# Patient Record
Sex: MALE | Race: WHITE | Employment: OTHER | URBAN - METROPOLITAN AREA
[De-identification: names, ages, dates, MRNs, and addresses within clinical notes are randomized per-mention and may not be internally consistent; named-entity substitution may affect disease eponyms.]

---

## 2017-07-06 ENCOUNTER — HOSPITAL ENCOUNTER (EMERGENCY)
Age: 82
Discharge: HOME | DRG: 853 | End: 2017-07-07
Attending: EMERGENCY MEDICINE
Payer: MEDICARE

## 2017-07-06 ENCOUNTER — APPOINTMENT (OUTPATIENT)
Dept: CT IMAGING | Age: 82
DRG: 853 | End: 2017-07-06
Attending: EMERGENCY MEDICINE
Payer: MEDICARE

## 2017-07-06 ENCOUNTER — APPOINTMENT (OUTPATIENT)
Dept: GENERAL RADIOLOGY | Age: 82
DRG: 853 | End: 2017-07-06
Attending: EMERGENCY MEDICINE
Payer: MEDICARE

## 2017-07-06 DIAGNOSIS — D72.829 LEUKOCYTOSIS, UNSPECIFIED TYPE: ICD-10-CM

## 2017-07-06 DIAGNOSIS — R50.9 FEVER, UNSPECIFIED FEVER CAUSE: ICD-10-CM

## 2017-07-06 DIAGNOSIS — R42 LIGHTHEADEDNESS: ICD-10-CM

## 2017-07-06 DIAGNOSIS — E86.0 DEHYDRATION: Primary | ICD-10-CM

## 2017-07-06 LAB
ALBUMIN SERPL BCP-MCNC: 2.8 G/DL (ref 3.5–5)
ALBUMIN/GLOB SERPL: 0.8 {RATIO} (ref 1.1–2.2)
ALP SERPL-CCNC: 169 U/L (ref 45–117)
ALT SERPL-CCNC: 55 U/L (ref 12–78)
ANION GAP BLD CALC-SCNC: 9 MMOL/L (ref 5–15)
APPEARANCE UR: CLEAR
AST SERPL W P-5'-P-CCNC: 45 U/L (ref 15–37)
BACTERIA URNS QL MICRO: ABNORMAL /HPF
BASOPHILS # BLD AUTO: 0 K/UL (ref 0–0.1)
BASOPHILS # BLD: 0 % (ref 0–1)
BILIRUB SERPL-MCNC: 0.9 MG/DL (ref 0.2–1)
BILIRUB UR QL: NEGATIVE
BUN SERPL-MCNC: 13 MG/DL (ref 6–20)
BUN/CREAT SERPL: 10 (ref 12–20)
CALCIUM SERPL-MCNC: 8.7 MG/DL (ref 8.5–10.1)
CHLORIDE SERPL-SCNC: 104 MMOL/L (ref 97–108)
CK SERPL-CCNC: 76 U/L (ref 39–308)
CO2 SERPL-SCNC: 23 MMOL/L (ref 21–32)
COLOR UR: ABNORMAL
CREAT SERPL-MCNC: 1.32 MG/DL (ref 0.7–1.3)
EOSINOPHIL # BLD: 0.5 K/UL (ref 0–0.4)
EOSINOPHIL NFR BLD: 3 % (ref 0–7)
EPITH CASTS URNS QL MICRO: ABNORMAL /LPF
ERYTHROCYTE [DISTWIDTH] IN BLOOD BY AUTOMATED COUNT: 13.1 % (ref 11.5–14.5)
GLOBULIN SER CALC-MCNC: 3.7 G/DL (ref 2–4)
GLUCOSE SERPL-MCNC: 152 MG/DL (ref 65–100)
GLUCOSE UR STRIP.AUTO-MCNC: NEGATIVE MG/DL
HCT VFR BLD AUTO: 38.3 % (ref 36.6–50.3)
HGB BLD-MCNC: 13.3 G/DL (ref 12.1–17)
HGB UR QL STRIP: ABNORMAL
KETONES UR QL STRIP.AUTO: 40 MG/DL
LEUKOCYTE ESTERASE UR QL STRIP.AUTO: NEGATIVE
LYMPHOCYTES # BLD AUTO: 3 % (ref 12–49)
LYMPHOCYTES # BLD: 0.5 K/UL (ref 0.8–3.5)
MCH RBC QN AUTO: 34.2 PG (ref 26–34)
MCHC RBC AUTO-ENTMCNC: 34.7 G/DL (ref 30–36.5)
MCV RBC AUTO: 98.5 FL (ref 80–99)
MONOCYTES # BLD: 0.8 K/UL (ref 0–1)
MONOCYTES NFR BLD AUTO: 5 % (ref 5–13)
MUCOUS THREADS URNS QL MICRO: ABNORMAL /LPF
NEUTS SEG # BLD: 14.7 K/UL (ref 1.8–8)
NEUTS SEG NFR BLD AUTO: 89 % (ref 32–75)
NITRITE UR QL STRIP.AUTO: NEGATIVE
PH UR STRIP: 5.5 [PH] (ref 5–8)
PLATELET # BLD AUTO: 179 K/UL (ref 150–400)
PLATELET COMMENTS,PCOM: ABNORMAL
POTASSIUM SERPL-SCNC: 3.8 MMOL/L (ref 3.5–5.1)
PROT SERPL-MCNC: 6.5 G/DL (ref 6.4–8.2)
PROT UR STRIP-MCNC: 30 MG/DL
RBC # BLD AUTO: 3.89 M/UL (ref 4.1–5.7)
RBC #/AREA URNS HPF: ABNORMAL /HPF (ref 0–5)
RBC MORPH BLD: ABNORMAL
SODIUM SERPL-SCNC: 136 MMOL/L (ref 136–145)
SP GR UR REFRACTOMETRY: 1.02 (ref 1–1.03)
TROPONIN I SERPL-MCNC: <0.04 NG/ML
UA: UC IF INDICATED,UAUC: ABNORMAL
UROBILINOGEN UR QL STRIP.AUTO: 1 EU/DL (ref 0.2–1)
WBC # BLD AUTO: 16.5 K/UL (ref 4.1–11.1)
WBC MORPH BLD: ABNORMAL
WBC URNS QL MICRO: ABNORMAL /HPF (ref 0–4)

## 2017-07-06 RX ORDER — GABAPENTIN 300 MG/1
300 CAPSULE ORAL DAILY
COMMUNITY
End: 2017-07-06

## 2017-07-06 RX ORDER — ACETAMINOPHEN 325 MG/1
650 TABLET ORAL ONCE
Status: COMPLETED | OUTPATIENT
Start: 2017-07-06 | End: 2017-07-06

## 2017-07-06 RX ADMIN — SODIUM CHLORIDE 1000 ML: 900 INJECTION, SOLUTION INTRAVENOUS at 21:01

## 2017-07-06 RX ADMIN — ACETAMINOPHEN 650 MG: 325 TABLET, FILM COATED ORAL at 23:56

## 2017-07-06 RX ADMIN — SODIUM CHLORIDE 1000 ML: 900 INJECTION, SOLUTION INTRAVENOUS at 22:29

## 2017-07-06 NOTE — ED NOTES
Assumed care of patient from triage. Patient states he has been feeling \"puney\" since Sunday with weakness and fatigue. Patient states he has been sleeping an abnormal amount, stating that he \"slept for 20 hours yesterday. \" Patient also reports a decreased appetite and has not eaten since Tuesday. Patient reports a GLF earlier today, but has not particular understanding of why he fell. Patient reports intermittent feelings of dizziness and lightheadedness. Patient is alert and oriented x3, respirations even and unlabored, vital signs stable. Monitor x3, family at bedside, call bell within reach.

## 2017-07-07 ENCOUNTER — APPOINTMENT (OUTPATIENT)
Dept: GENERAL RADIOLOGY | Age: 82
DRG: 853 | End: 2017-07-07
Attending: EMERGENCY MEDICINE
Payer: MEDICARE

## 2017-07-07 ENCOUNTER — APPOINTMENT (OUTPATIENT)
Dept: ULTRASOUND IMAGING | Age: 82
DRG: 853 | End: 2017-07-07
Attending: FAMILY MEDICINE
Payer: MEDICARE

## 2017-07-07 ENCOUNTER — APPOINTMENT (OUTPATIENT)
Dept: CT IMAGING | Age: 82
DRG: 853 | End: 2017-07-07
Attending: FAMILY MEDICINE
Payer: MEDICARE

## 2017-07-07 ENCOUNTER — HOSPITAL ENCOUNTER (INPATIENT)
Age: 82
LOS: 7 days | Discharge: HOME HEALTH | DRG: 853 | End: 2017-07-14
Attending: EMERGENCY MEDICINE | Admitting: FAMILY MEDICINE
Payer: MEDICARE

## 2017-07-07 VITALS
DIASTOLIC BLOOD PRESSURE: 70 MMHG | OXYGEN SATURATION: 94 % | WEIGHT: 203 LBS | BODY MASS INDEX: 26.05 KG/M2 | SYSTOLIC BLOOD PRESSURE: 158 MMHG | HEART RATE: 103 BPM | RESPIRATION RATE: 14 BRPM | TEMPERATURE: 99.6 F | HEIGHT: 74 IN

## 2017-07-07 DIAGNOSIS — A41.9 SEVERE SEPSIS (HCC): Primary | ICD-10-CM

## 2017-07-07 DIAGNOSIS — J18.9 CAP (COMMUNITY ACQUIRED PNEUMONIA): ICD-10-CM

## 2017-07-07 DIAGNOSIS — R65.20 SEVERE SEPSIS (HCC): Primary | ICD-10-CM

## 2017-07-07 LAB
ALBUMIN SERPL BCP-MCNC: 2.7 G/DL (ref 3.5–5)
ALBUMIN/GLOB SERPL: 0.7 {RATIO} (ref 1.1–2.2)
ALP SERPL-CCNC: 175 U/L (ref 45–117)
ALT SERPL-CCNC: 76 U/L (ref 12–78)
AMORPH CRY URNS QL MICRO: ABNORMAL
ANION GAP BLD CALC-SCNC: 9 MMOL/L (ref 5–15)
APPEARANCE UR: ABNORMAL
AST SERPL W P-5'-P-CCNC: 42 U/L (ref 15–37)
ATRIAL RATE: 88 BPM
BACTERIA URNS QL MICRO: NEGATIVE /HPF
BASOPHILS # BLD AUTO: 0 K/UL
BASOPHILS # BLD: 0 %
BILIRUB SERPL-MCNC: 0.9 MG/DL (ref 0.2–1)
BILIRUB UR QL CFM: NEGATIVE
BUN SERPL-MCNC: 18 MG/DL (ref 6–20)
BUN/CREAT SERPL: 13 (ref 12–20)
CALCIUM SERPL-MCNC: 8.4 MG/DL (ref 8.5–10.1)
CALCULATED P AXIS, ECG09: 37 DEGREES
CALCULATED R AXIS, ECG10: -128 DEGREES
CALCULATED T AXIS, ECG11: 5 DEGREES
CHLORIDE SERPL-SCNC: 100 MMOL/L (ref 97–108)
CK MB CFR SERPL CALC: 0.6 % (ref 0–2.5)
CK MB SERPL-MCNC: 2.3 NG/ML (ref 5–25)
CK SERPL-CCNC: 361 U/L (ref 39–308)
CO2 SERPL-SCNC: 22 MMOL/L (ref 21–32)
COLOR UR: ABNORMAL
CREAT SERPL-MCNC: 1.39 MG/DL (ref 0.7–1.3)
DIAGNOSIS, 93000: NORMAL
EOSINOPHIL # BLD: 0.9 K/UL
EOSINOPHIL NFR BLD: 4 %
EPITH CASTS URNS QL MICRO: ABNORMAL /LPF
ERYTHROCYTE [DISTWIDTH] IN BLOOD BY AUTOMATED COUNT: 13.3 % (ref 11.5–14.5)
GLOBULIN SER CALC-MCNC: 3.9 G/DL (ref 2–4)
GLUCOSE BLD STRIP.AUTO-MCNC: 173 MG/DL (ref 65–100)
GLUCOSE SERPL-MCNC: 153 MG/DL (ref 65–100)
GLUCOSE UR STRIP.AUTO-MCNC: NEGATIVE MG/DL
HCT VFR BLD AUTO: 37.8 % (ref 36.6–50.3)
HGB BLD-MCNC: 13.2 G/DL (ref 12.1–17)
HGB UR QL STRIP: ABNORMAL
HYALINE CASTS URNS QL MICRO: ABNORMAL /LPF (ref 0–5)
KETONES UR QL STRIP.AUTO: ABNORMAL MG/DL
LACTATE SERPL-SCNC: 1.2 MMOL/L (ref 0.4–2)
LACTATE SERPL-SCNC: 2.3 MMOL/L (ref 0.4–2)
LEUKOCYTE ESTERASE UR QL STRIP.AUTO: NEGATIVE
LYMPHOCYTES # BLD AUTO: 2 %
LYMPHOCYTES # BLD: 0.4 K/UL
MAGNESIUM SERPL-MCNC: 1.9 MG/DL (ref 1.6–2.4)
MCH RBC QN AUTO: 33.9 PG (ref 26–34)
MCHC RBC AUTO-ENTMCNC: 34.9 G/DL (ref 30–36.5)
MCV RBC AUTO: 97.2 FL (ref 80–99)
MONOCYTES # BLD: 0.2 K/UL
MONOCYTES NFR BLD AUTO: 1 %
MUCOUS THREADS URNS QL MICRO: ABNORMAL /LPF
NEUTS BAND NFR BLD MANUAL: 10 %
NEUTS SEG # BLD: 20.7 K/UL
NEUTS SEG NFR BLD AUTO: 83 %
NITRITE UR QL STRIP.AUTO: NEGATIVE
P-R INTERVAL, ECG05: 188 MS
PH UR STRIP: 5 [PH] (ref 5–8)
PLATELET # BLD AUTO: 149 K/UL (ref 150–400)
PLATELET COMMENTS,PCOM: ABNORMAL
POTASSIUM SERPL-SCNC: 3.9 MMOL/L (ref 3.5–5.1)
PROT SERPL-MCNC: 6.6 G/DL (ref 6.4–8.2)
PROT UR STRIP-MCNC: 100 MG/DL
Q-T INTERVAL, ECG07: 462 MS
QRS DURATION, ECG06: 156 MS
QTC CALCULATION (BEZET), ECG08: 559 MS
RBC # BLD AUTO: 3.89 M/UL (ref 4.1–5.7)
RBC #/AREA URNS HPF: ABNORMAL /HPF (ref 0–5)
RBC MORPH BLD: ABNORMAL
SERVICE CMNT-IMP: ABNORMAL
SODIUM SERPL-SCNC: 131 MMOL/L (ref 136–145)
SP GR UR REFRACTOMETRY: >1.03 (ref 1–1.03)
TROPONIN I SERPL-MCNC: 0.08 NG/ML
TROPONIN I SERPL-MCNC: 0.1 NG/ML
UA: UC IF INDICATED,UAUC: ABNORMAL
UROBILINOGEN UR QL STRIP.AUTO: 1 EU/DL (ref 0.2–1)
VENTRICULAR RATE, ECG03: 88 BPM
WBC # BLD AUTO: 22.2 K/UL (ref 4.1–11.1)
WBC URNS QL MICRO: ABNORMAL /HPF (ref 0–4)

## 2017-07-07 PROCEDURE — 76705 ECHO EXAM OF ABDOMEN: CPT

## 2017-07-07 PROCEDURE — 74011250637 HC RX REV CODE- 250/637: Performed by: EMERGENCY MEDICINE

## 2017-07-07 PROCEDURE — 87040 BLOOD CULTURE FOR BACTERIA: CPT | Performed by: EMERGENCY MEDICINE

## 2017-07-07 PROCEDURE — 36415 COLL VENOUS BLD VENIPUNCTURE: CPT | Performed by: EMERGENCY MEDICINE

## 2017-07-07 PROCEDURE — 74011250636 HC RX REV CODE- 250/636: Performed by: FAMILY MEDICINE

## 2017-07-07 PROCEDURE — 84484 ASSAY OF TROPONIN QUANT: CPT | Performed by: EMERGENCY MEDICINE

## 2017-07-07 PROCEDURE — 93005 ELECTROCARDIOGRAM TRACING: CPT

## 2017-07-07 PROCEDURE — 65660000000 HC RM CCU STEPDOWN

## 2017-07-07 PROCEDURE — 99285 EMERGENCY DEPT VISIT HI MDM: CPT

## 2017-07-07 PROCEDURE — 80053 COMPREHEN METABOLIC PANEL: CPT | Performed by: EMERGENCY MEDICINE

## 2017-07-07 PROCEDURE — 71250 CT THORAX DX C-: CPT

## 2017-07-07 PROCEDURE — 96365 THER/PROPH/DIAG IV INF INIT: CPT

## 2017-07-07 PROCEDURE — 71010 XR CHEST PORT: CPT

## 2017-07-07 PROCEDURE — 83735 ASSAY OF MAGNESIUM: CPT | Performed by: EMERGENCY MEDICINE

## 2017-07-07 PROCEDURE — 81001 URINALYSIS AUTO W/SCOPE: CPT | Performed by: EMERGENCY MEDICINE

## 2017-07-07 PROCEDURE — 85025 COMPLETE CBC W/AUTO DIFF WBC: CPT | Performed by: EMERGENCY MEDICINE

## 2017-07-07 PROCEDURE — 82550 ASSAY OF CK (CPK): CPT | Performed by: EMERGENCY MEDICINE

## 2017-07-07 PROCEDURE — 74011250636 HC RX REV CODE- 250/636: Performed by: EMERGENCY MEDICINE

## 2017-07-07 PROCEDURE — 96361 HYDRATE IV INFUSION ADD-ON: CPT

## 2017-07-07 PROCEDURE — 83605 ASSAY OF LACTIC ACID: CPT | Performed by: EMERGENCY MEDICINE

## 2017-07-07 PROCEDURE — 82962 GLUCOSE BLOOD TEST: CPT

## 2017-07-07 PROCEDURE — 74011250637 HC RX REV CODE- 250/637: Performed by: FAMILY MEDICINE

## 2017-07-07 RX ORDER — DIGOXIN 125 MCG
0.25 TABLET ORAL DAILY
Status: DISCONTINUED | OUTPATIENT
Start: 2017-07-08 | End: 2017-07-08

## 2017-07-07 RX ORDER — DOXAZOSIN 4 MG/1
4 TABLET ORAL DAILY
COMMUNITY
End: 2017-07-14

## 2017-07-07 RX ORDER — INSULIN LISPRO 100 [IU]/ML
INJECTION, SOLUTION INTRAVENOUS; SUBCUTANEOUS
Status: DISCONTINUED | OUTPATIENT
Start: 2017-07-07 | End: 2017-07-14 | Stop reason: HOSPADM

## 2017-07-07 RX ORDER — LEVOFLOXACIN 5 MG/ML
750 INJECTION, SOLUTION INTRAVENOUS
Status: COMPLETED | OUTPATIENT
Start: 2017-07-07 | End: 2017-07-07

## 2017-07-07 RX ORDER — CEPHALEXIN 500 MG/1
500 CAPSULE ORAL 4 TIMES DAILY
Qty: 28 CAP | Refills: 0 | Status: SHIPPED | OUTPATIENT
Start: 2017-07-07 | End: 2017-07-07 | Stop reason: DRUGHIGH

## 2017-07-07 RX ORDER — CEPHALEXIN 500 MG/1
500 CAPSULE ORAL 3 TIMES DAILY
COMMUNITY
End: 2017-07-14

## 2017-07-07 RX ORDER — ALBUTEROL SULFATE 0.83 MG/ML
2.5 SOLUTION RESPIRATORY (INHALATION)
Status: DISCONTINUED | OUTPATIENT
Start: 2017-07-08 | End: 2017-07-10

## 2017-07-07 RX ORDER — ACETAMINOPHEN 325 MG/1
650 TABLET ORAL
Status: COMPLETED | OUTPATIENT
Start: 2017-07-07 | End: 2017-07-07

## 2017-07-07 RX ORDER — SODIUM CHLORIDE 0.9 % (FLUSH) 0.9 %
5-10 SYRINGE (ML) INJECTION AS NEEDED
Status: DISCONTINUED | OUTPATIENT
Start: 2017-07-07 | End: 2017-07-13 | Stop reason: SDUPTHER

## 2017-07-07 RX ORDER — DEXTROSE 50 % IN WATER (D50W) INTRAVENOUS SYRINGE
12.5-25 AS NEEDED
Status: DISCONTINUED | OUTPATIENT
Start: 2017-07-07 | End: 2017-07-14 | Stop reason: HOSPADM

## 2017-07-07 RX ORDER — LEVOTHYROXINE SODIUM 25 UG/1
25 TABLET ORAL
Status: DISCONTINUED | OUTPATIENT
Start: 2017-07-08 | End: 2017-07-14 | Stop reason: HOSPADM

## 2017-07-07 RX ORDER — GUAIFENESIN 100 MG/5ML
81 LIQUID (ML) ORAL DAILY
Status: DISCONTINUED | OUTPATIENT
Start: 2017-07-08 | End: 2017-07-14 | Stop reason: HOSPADM

## 2017-07-07 RX ORDER — SODIUM CHLORIDE 0.9 % (FLUSH) 0.9 %
5-10 SYRINGE (ML) INJECTION EVERY 8 HOURS
Status: DISCONTINUED | OUTPATIENT
Start: 2017-07-07 | End: 2017-07-13 | Stop reason: SDUPTHER

## 2017-07-07 RX ORDER — ACETAMINOPHEN 325 MG/1
650 TABLET ORAL
Status: DISCONTINUED | OUTPATIENT
Start: 2017-07-07 | End: 2017-07-14 | Stop reason: HOSPADM

## 2017-07-07 RX ORDER — MAGNESIUM SULFATE 100 %
4 CRYSTALS MISCELLANEOUS AS NEEDED
Status: DISCONTINUED | OUTPATIENT
Start: 2017-07-07 | End: 2017-07-14 | Stop reason: HOSPADM

## 2017-07-07 RX ORDER — FLECAINIDE ACETATE 100 MG/1
100 TABLET ORAL 2 TIMES DAILY
Status: DISCONTINUED | OUTPATIENT
Start: 2017-07-08 | End: 2017-07-08

## 2017-07-07 RX ORDER — DOXAZOSIN 2 MG/1
4 TABLET ORAL DAILY
Status: DISCONTINUED | OUTPATIENT
Start: 2017-07-08 | End: 2017-07-08

## 2017-07-07 RX ORDER — CAPTOPRIL 50 MG/1
25 TABLET ORAL 2 TIMES DAILY
COMMUNITY
End: 2017-07-14

## 2017-07-07 RX ORDER — CAPTOPRIL 25 MG/1
25 TABLET ORAL 2 TIMES DAILY
Status: DISCONTINUED | OUTPATIENT
Start: 2017-07-08 | End: 2017-07-08

## 2017-07-07 RX ORDER — SODIUM CHLORIDE 9 MG/ML
75 INJECTION, SOLUTION INTRAVENOUS CONTINUOUS
Status: DISCONTINUED | OUTPATIENT
Start: 2017-07-07 | End: 2017-07-09

## 2017-07-07 RX ORDER — CIPROFLOXACIN 500 MG/1
500 TABLET ORAL
Status: COMPLETED | OUTPATIENT
Start: 2017-07-07 | End: 2017-07-07

## 2017-07-07 RX ADMIN — AZITHROMYCIN MONOHYDRATE 500 MG: 500 INJECTION, POWDER, LYOPHILIZED, FOR SOLUTION INTRAVENOUS at 23:19

## 2017-07-07 RX ADMIN — APIXABAN 5 MG: 5 TABLET, FILM COATED ORAL at 23:20

## 2017-07-07 RX ADMIN — Medication 10 ML: at 22:53

## 2017-07-07 RX ADMIN — ACETAMINOPHEN 650 MG: 325 TABLET, FILM COATED ORAL at 17:45

## 2017-07-07 RX ADMIN — SODIUM CHLORIDE 75 ML/HR: 900 INJECTION, SOLUTION INTRAVENOUS at 22:54

## 2017-07-07 RX ADMIN — SODIUM CHLORIDE 1000 ML: 900 INJECTION, SOLUTION INTRAVENOUS at 17:27

## 2017-07-07 RX ADMIN — LEVOFLOXACIN 750 MG: 5 INJECTION, SOLUTION INTRAVENOUS at 18:58

## 2017-07-07 RX ADMIN — SODIUM CHLORIDE 500 ML: 900 INJECTION, SOLUTION INTRAVENOUS at 17:27

## 2017-07-07 RX ADMIN — SODIUM CHLORIDE 1300 ML: 900 INJECTION, SOLUTION INTRAVENOUS at 18:54

## 2017-07-07 RX ADMIN — CIPROFLOXACIN HYDROCHLORIDE 500 MG: 500 TABLET, FILM COATED ORAL at 00:49

## 2017-07-07 NOTE — IP AVS SNAPSHOT
Current Discharge Medication List  
  
START taking these medications Dose & Instructions Dispensing Information Comments Morning Noon Evening Bedtime  
 amiodarone 200 mg tablet Commonly known as:  CORDARONE Your last dose was: Your next dose is:    
   
   
 Dose:  200 mg Take 1 Tab by mouth daily. Quantity:  30 Tab Refills:  3  
     
   
   
   
  
 aspirin 81 mg chewable tablet Your last dose was: Your next dose is:    
   
   
 Dose:  81 mg Take 1 Tab by mouth daily for 30 days. Quantity:  30 Tab Refills:  0  
     
   
   
   
  
 clopidogrel 75 mg Tab Commonly known as:  PLAVIX Your last dose was: Your next dose is:    
   
   
 Dose:  75 mg Take 1 Tab by mouth daily. Quantity:  30 Tab Refills:  11  
     
   
   
   
  
 furosemide 40 mg tablet Commonly known as:  LASIX Your last dose was: Your next dose is:    
   
   
 Dose:  40 mg Take 1 Tab by mouth daily. Quantity:  30 Tab Refills:  3  
     
   
   
   
  
 loperamide 2 mg capsule Commonly known as:  IMODIUM Your last dose was: Your next dose is:    
   
   
 Dose:  2 mg Take 1 Cap by mouth every four (4) hours as needed for Diarrhea for up to 10 days. This is available over the counter Quantity:  10 Cap Refills:  0  
     
   
   
   
  
 metoprolol succinate 25 mg XL tablet Commonly known as:  TOPROL-XL Your last dose was: Your next dose is:    
   
   
 Dose:  25 mg Take 1 Tab by mouth daily. Quantity:  30 Tab Refills:  11  
     
   
   
   
  
 potassium chloride SR 10 mEq tablet Commonly known as:  KLOR-CON 10 Your last dose was: Your next dose is:    
   
   
 Dose:  10 mEq Take 1 Tab by mouth daily. Quantity:  30 Tab Refills:  11  
     
   
   
   
  
 pravastatin 20 mg tablet Commonly known as:  PRAVACHOL Your last dose was: Your next dose is: Dose:  20 mg Take 1 Tab by mouth daily. Quantity:  30 Tab Refills:  11 CONTINUE these medications which have CHANGED Dose & Instructions Dispensing Information Comments Morning Noon Evening Bedtime  
 apixaban 2.5 mg tablet Commonly known as:  Marika Colon What changed:   
- medication strength 
- how much to take Your last dose was: Your next dose is:    
   
   
 Dose:  2.5 mg Take 1 Tab by mouth two (2) times a day. Quantity:  60 Tab Refills:  11  
     
   
   
   
  
 levothyroxine 25 mcg tablet Commonly known as:  SYNTHROID What changed:  Another medication with the same name was removed. Continue taking this medication, and follow the directions you see here. Your last dose was: Your next dose is: TAKE 1 TABLET DAILY Quantity:  90 Tab Refills:  3 STOP taking these medications   
 captopril 50 mg tablet Commonly known as:  CAPOTEN  
   
  
 cephALEXin 500 mg capsule Commonly known as:  KEFLEX  
   
  
 doxazosin 2 mg tablet Commonly known as:  CARDURA doxazosin 4 mg tablet Commonly known as:  CARDURA  
   
  
 TAMBOCOR 100 mg tablet Generic drug:  flecainide Where to Get Your Medications Information on where to get these meds will be given to you by the nurse or doctor. ! Ask your nurse or doctor about these medications  
  amiodarone 200 mg tablet  
 apixaban 2.5 mg tablet  
 aspirin 81 mg chewable tablet  
 clopidogrel 75 mg Tab  
 furosemide 40 mg tablet  
 loperamide 2 mg capsule  
 metoprolol succinate 25 mg XL tablet  
 potassium chloride SR 10 mEq tablet  
 pravastatin 20 mg tablet

## 2017-07-07 NOTE — IP AVS SNAPSHOT
[address] [facility] 
[phone] Patient: Lillette Lennox MRN: [id number] DPH:3/9/1992 You are allergic to the following Allergen Reactions Lovastatin Other (comments) Foot pain Recent Documentation Height Weight BMI Smoking Status 1.88 m 91.6 kg 25.93 kg/m2 Former Smoker Emergency Contacts Name Discharge Info Relation Home Work Mobile [address] CAREGIVER [3] Child [2] [phone] Jayro Mehta  Child [2] [phone] About your hospitalization You were admitted on:  July 7, 2017 You last received care in the:  [facility] You were discharged on:  July 14, 2017 Unit phone number:  [phone] Why you were hospitalized Your primary diagnosis was:  Not on File Your diagnoses also included:  Pneumonia, Nstemi (Non-St Elevated Myocardial Infarction) (Hcc), Presence Of Stent In Lad Coronary Artery, Systolic Chf, Acute (Hcc), Paroxysmal Atrial Fibrillation (Hcc) Providers Seen During Your Hospitalizations Provider Role Specialty Primary office phone Gaby Tom MD Attending Provider Emergency Medicine [phone] Concepcion Cruz DO Attending Provider Internal Medicine [phone]  
 Clarissa Wynn MD Attending Provider Hospitalist [phone] Tre Zaragoza MD Attending Provider Cardiology [phone] Ana Zuluaga MD Attending Provider Internal Medicine [phone] Your Primary Care Physician (PCP) Primary Care Physician Office Phone Office Fax Gabriella Iniguez [phone] Follow-up Information Follow up With Details Comments Contact Info Maryam Baugh NP Go on 7/31/2017 Cardiology follow up at 9:15 AM  [address] [facility] 
[phone] Outpatient Clinic   Get your potassium and kidney function checked in 1 week and tell them that on 7/14 your BUN was 23 and Cr was 1.28 (so they will have comparison). Harriet Oliver, [address] [facility] 
[phone] [place]  They will provide home health services [phone] Current Discharge Medication List  
  
START taking these medications Dose & Instructions Dispensing Information Comments Morning Noon Evening Bedtime  
 amiodarone 200 mg tablet Commonly known as:  CORDARONE Your last dose was: Your next dose is:    
   
   
 Dose:  200 mg Take 1 Tab by mouth daily. Quantity:  30 Tab Refills:  3  
     
   
   
   
  
 aspirin 81 mg chewable tablet Your last dose was: Your next dose is:    
   
   
 Dose:  81 mg Take 1 Tab by mouth daily for 30 days. Quantity:  30 Tab Refills:  0  
     
   
   
   
  
 clopidogrel 75 mg Tab Commonly known as:  PLAVIX Your last dose was: Your next dose is:    
   
   
 Dose:  75 mg Take 1 Tab by mouth daily. Quantity:  30 Tab Refills:  11  
     
   
   
   
  
 furosemide 40 mg tablet Commonly known as:  LASIX Your last dose was: Your next dose is:    
   
   
 Dose:  40 mg Take 1 Tab by mouth daily. Quantity:  30 Tab Refills:  3  
     
   
   
   
  
 loperamide 2 mg capsule Commonly known as:  IMODIUM Your last dose was: Your next dose is:    
   
   
 Dose:  2 mg Take 1 Cap by mouth every four (4) hours as needed for Diarrhea for up to 10 days. This is available over the counter Quantity:  10 Cap Refills:  0  
     
   
   
   
  
 metoprolol succinate 25 mg XL tablet Commonly known as:  TOPROL-XL Your last dose was: Your next dose is:    
   
   
 Dose:  25 mg Take 1 Tab by mouth daily. Quantity:  30 Tab Refills:  11  
     
   
   
   
  
 potassium chloride SR 10 mEq tablet Commonly known as:  KLOR-CON 10 Your last dose was: Your next dose is:    
   
   
 Dose:  10 mEq Take 1 Tab by mouth daily. Quantity:  30 Tab Refills:  11  
     
   
   
   
  
 pravastatin 20 mg tablet Commonly known as:  PRAVACHOL Your last dose was: Your next dose is:    
   
   
 Dose:  20 mg Take 1 Tab by mouth daily. Quantity:  30 Tab Refills:  11 CONTINUE these medications which have CHANGED Dose & Instructions Dispensing Information Comments Morning Noon Evening Bedtime  
 apixaban 2.5 mg tablet Commonly known as:  Romina Rogers What changed:   
- medication strength 
- how much to take Your last dose was: Your next dose is:    
   
   
 Dose:  2.5 mg Take 1 Tab by mouth two (2) times a day. Quantity:  60 Tab Refills:  11  
     
   
   
   
  
 levothyroxine 25 mcg tablet Commonly known as:  SYNTHROID What changed:  Another medication with the same name was removed. Continue taking this medication, and follow the directions you see here. Your last dose was: Your next dose is: TAKE 1 TABLET DAILY Quantity:  90 Tab Refills:  3 STOP taking these medications   
 captopril 50 mg tablet Commonly known as:  CAPOTEN  
   
  
 cephALEXin 500 mg capsule Commonly known as:  KEFLEX  
   
  
 doxazosin 2 mg tablet Commonly known as:  CARDURA doxazosin 4 mg tablet Commonly known as:  CARDURA  
   
  
 TAMBOCOR 100 mg tablet Generic drug:  flecainide Where to Get Your Medications Information on where to get these meds will be given to you by the nurse or doctor. ! Ask your nurse or doctor about these medications  
  amiodarone 200 mg tablet  
 apixaban 2.5 mg tablet  
 aspirin 81 mg chewable tablet  
 clopidogrel 75 mg Tab  
 furosemide 40 mg tablet  
 loperamide 2 mg capsule metoprolol succinate 25 mg XL tablet  
 potassium chloride SR 10 mEq tablet  
 pravastatin 20 mg tablet Discharge Instructions HOSPITALIST DISCHARGE INSTRUCTIONS 
 
NAME: Yo Kaba :  1935 MRN:  [id number] Date/Time:  2017 9:56 AM 
 
ADMIT DATE: 2017 DISCHARGE DATE: 2017 DISCHARGE DIAGNOSIS: 
Active Problems: 
  Paroxysmal atrial fibrillation ([facility] (4/15/2013) Pneumonia (2017) NSTEMI (non-ST elevated myocardial infarction) ([facility] (2017) Presence of stent in LAD coronary artery (2017) Systolic CHF, acute ([facility] (2017) MEDICATIONS: 
As per medication reconciliation  list 
· It is important that you take the medication exactly as they are prescribed. · Keep your medication in the bottles provided by the pharmacist and keep a list of the medication names, dosages, and times to be taken in your wallet. · Do not take other medications without consulting your doctor. · Also take an over the counter Probiotic to see if this helps with you diarrhea. Pain Management: Tylenol (acetaminophen) as needed (avoid NSAIDs like Ibuprofen and Aleve, etc.. Kwan Abreu ) What to do at [facility] Recommended diet:  Cardiac Diet Recommended activity: Light Activity as tolerated If you experience any of the following symptoms then please call your primary care physician or return to the emergency room if you cannot get hold of your doctor: 
Fever, chills, nausea, vomiting, diarrhea, change in mentation, falling, bleeding, shortness of breath, chest pain or any other concerning symptoms. Follow Up: Follow-up with Cardiology as arranged. Get you potassium and kidney checked in 1 week. Information obtained by : 
I understand that if any problems occur once I am at home I am to contact my physician. I understand and acknowledge receipt of the instructions indicated above. Physician's or R.N.'s Signature                                                                  Date/Time Patient or Representative Signature                                                          Date/Time Discharge Orders None [employer] Announcement We are excited to announce that we are making your provider's discharge notes available to you in [employer]. You will see these notes when they are completed and signed by the physician that discharged you from your recent hospital stay. If you have any questions or concerns about any information you see in [employer], please call the Health Information Department where you were seen or reach out to your Primary Care Provider for more information about your plan of care. Introducing [facility]! [facility] introduces [employer] patient portal. Now you can access parts of your medical record, email your doctor's office, and request medication refills online. 1. In your internet browser, go to [email or web address] 2. Click on the First Time User? Click Here link in the Sign In box. You will see the New Member Sign Up page. 3. Enter your [employer] Access Code exactly as it appears below. You will not need to use this code after youve completed the sign-up process. If you do not sign up before the expiration date, you must request a new code. · [employer] Access Code: [email or web address] Expires: 10/4/2017  6:05 PM 
 
4. Enter the last four digits of your Social Security Number (xxxx) and Date of Birth (mm/dd/yyyy) as indicated and click Submit. You will be taken to the next sign-up page. 5. Create a [employer] ID. This will be your [employer] login ID and cannot be changed, so think of one that is secure and easy to remember. 6. Create a [employer] password. You can change your password at any time. 7. Enter your Password Reset Question and Answer. This can be used at a later time if you forget your password. 8. Enter your e-mail address. You will receive e-mail notification when new information is available in [address] 9. Click Sign Up. You can now view and download portions of your medical record. 10. Click the Download Summary menu link to download a portable copy of your medical information. If you have questions, please visit the Frequently Asked Questions section of the [employer] website. Remember, [employer] is NOT to be used for urgent needs. For medical emergencies, dial 911. Now available from your iPhone and Android! General Information Please provide this summary of care documentation to your next provider. Patient Signature:  ____________________________________________________________ Date:  ____________________________________________________________  
  
Tanner Gomez Provider Signature:  ____________________________________________________________ Date:  ____________________________________________________________

## 2017-07-07 NOTE — PROGRESS NOTES
Called and spoke to patient. Pt feeling slightly better. He picked up keflex from pharmacy and started. UCX pending; he has an appointment with his pcp at 3:30 today. He agreed to return to ed if any fevers, n/v, feeling worse.  Discussed ua results, and will follow cx;

## 2017-07-07 NOTE — PROGRESS NOTES
Pharmacy Clarification of Prior to Admission Medication Regimen     The patient was interviewed regarding clarification of the prior to admission medication regimen. Wife and son were present in room and obtained permission from patient to discuss drug regimen with visitor(s) present. Patient was questioned regarding use of any other inhalers, topical products, over the counter medications, herbal medications, vitamin products or ophthalmic/nasal/otic medication use. Information Obtained From: Patient, personal med list     Pertinent Pharmacy Findings:   Lupron injection: Patient stated he received injections in his stomach 'about every 4-5 months' from Dr. Paul Hernandes' at [facility]. Patient stated his next appointment for the injection is 7/21/17.  Identified High Alert Medication Information  o Current Anticoagulants:  - Name: apixaban (ELIQUIS) 5 mg tablet      PTA medication list was corrected to the following:     Prior to Admission Medications   Prescriptions Last Dose Informant Patient Reported? Taking? apixaban (ELIQUIS) 5 mg tablet 7/6/2017 at Unknown time Self Yes Yes   Sig: Take 5 mg by mouth two (2) times a day. captopril (CAPOTEN) 50 mg tablet 7/6/2017 at Unknown time Self Yes Yes   Sig: Take 25 mg by mouth two (2) times a day. cephALEXin (KEFLEX) 500 mg capsule 7/7/2017 at Unknown time Self Yes Yes   Sig: Take 500 mg by mouth three (3) times daily. digoxin (LANOXIN) 0.25 mg tablet 7/6/2017 at Unknown time Self Yes Yes   Sig: Take 0.25 mg by mouth daily. doxazosin (CARDURA) 4 mg tablet 7/6/2017 at Unknown time Self Yes Yes   Sig: Take 4 mg by mouth daily. flecainide (TAMBOCOR) 100 mg tablet 7/6/2017 at Unknown time Self Yes Yes   Sig: Take 100 mg by mouth two (2) times a day.    levothyroxine (SYNTHROID) 25 mcg tablet 7/6/2017 at Unknown time Self No Yes   Sig: TAKE 1 TABLET DAILY      Facility-Administered Medications: None          Thank you,  Bay Nassar, [facility]  Medication History Pharmacy Technician

## 2017-07-07 NOTE — ED PROVIDER NOTES
HPI Comments: Lillette Lennox is a [de-identified] y.o. male with PMhx significant for HTN, prostate CA, A-fib, and DM who presents via EMS to the ED with cc of persistent lightheadedness and nausea for the last 2 to 3 days. Pt states that he fell in the bathroom today and had difficulty getting up, but was able to ambulate to his bed on his own without difficulty once he was able to get up off the floor. He denies hitting his head upon falling, LOC, or any injuries as a result of the fall. Pt notes that his nausea and lightheadedness worsen upon standing. Pt also c/o dry heaving and loss of appetite. He notes that he is treated with Alex Picket every 6 months for his prostate cancer and was given his most recent injection in February. His next Alex Picket treatment is in August.   He denies any history of CHF and states he is currently on Eliquis for A-fib with no recent medication changes. He denies any surgical history of coronary stents. Pt denies abdominal pain, fever, vomiting, dizziness, cough, melena, rectal bleeding, hematochezia, or any new urinary symptoms. Social Hx: - Tobacco, + EtOH, - Illicit Drugs    PCP: Saran Shah. [place], [place]  Urology: [facility]  Cardiology: Dr. Magdalena Velasquez     There are no other complaints, changes or physical findings at this time. The history is provided by the patient and the EMS personnel. Past Medical History:   Diagnosis Date    DJD (degenerative joint disease) 10/11/2011    Eczema 4/28/2014    Meridith Councilman, MD . Derm     Elevated lipids 10/11/2011    History of prostate cancer 10/11/2011    prostatectomy ; then for recurrence, XRT    HTN (hypertension) 10/11/2011    Hypothyroidism 10/11/2011    [facility]oxysmal a-fib ([facility] [address]     RBBB (right bundle branch block)     Type II or unspecified type diabetes mellitus without mention of complication, not stated as uncontrolled 10/21/2013    2013.  A1C 8.5 %    Urinary incontinence 10/11/2011       Past Surgical History: Procedure Laterality Date    HX CATARACT REMOVAL      HX OTHER SURGICAL      pilonidal cyst surgery    HX PROSTATECTOMY           Family History:   Problem Relation Age of Onset    Hypertension Mother     Hypertension Maternal Grandmother     Stroke Maternal Grandmother     Cancer Father      lung    Diabetes Other      maternal cousin       Social History     Social History    Marital status:      Spouse name: N/A    Number of children: N/A    Years of education: N/A     Occupational History    Not on file. Social History Main Topics    Smoking status: Former Smoker    Smokeless tobacco: Not on file    Alcohol use Yes      Comment: 4 drinks or less per week     Drug use: No    Sexual activity: Not on file     Other Topics Concern    Not on file     Social History Narrative         ALLERGIES: Lovastatin    Review of Systems   Constitutional: Positive for appetite change (decreased). Negative for activity change and fever. HENT: Negative for congestion and sore throat. Eyes: Negative for pain and redness. Respiratory: Negative for cough, chest tightness and shortness of breath. Cardiovascular: Negative for chest pain and palpitations. Gastrointestinal: Positive for nausea. Negative for abdominal pain, anal bleeding, blood in stool, diarrhea and vomiting.        + dry heaving, - melena   Genitourinary: Negative for difficulty urinating, dysuria, frequency and urgency. Musculoskeletal: Negative for arthralgias, back pain, myalgias and neck pain. Skin: Negative for rash. Neurological: Positive for light-headedness. Negative for syncope and headaches. - LOC   Psychiatric/Behavioral: Negative for confusion. All other systems reviewed and are negative.     Patient Vitals for the past 12 hrs:   Temp Pulse Resp BP SpO2   07/06/17 2157 - 98 - 173/70 -   07/06/17 2145 - - - 178/61 -   07/06/17 2115 - - - 162/71 97 %   07/06/17 2100 - - - (!) 155/91 96 %   07/06/17 2000 - - - 144/64 97 %   07/06/17 1741 98.6 °F (37 °C) 88 14 145/90 96 %       Physical Exam   Constitutional: He is oriented to person, place, and time. He appears well-developed and well-nourished. No distress. HENT:   Head: Normocephalic and atraumatic. Nose: Nose normal.   Mouth/Throat: Oropharynx is clear and moist.   Eyes: Conjunctivae and EOM are normal. Pupils are equal, round, and reactive to light. No scleral icterus. Conjunctiva clear bilaterally; Neck: Normal range of motion. Neck supple. No JVD present. No tracheal deviation present. No thyromegaly present. Cardiovascular: Normal rate, regular rhythm and intact distal pulses. Exam reveals no gallop and no friction rub. No murmur heard. Pulmonary/Chest: Effort normal and breath sounds normal. No stridor. No respiratory distress. He has no wheezes. He has no rales. He exhibits no tenderness. Abdominal: Soft. Bowel sounds are normal. He exhibits no distension. There is no tenderness. There is no rebound and no guarding. Musculoskeletal: Normal range of motion. He exhibits no edema or tenderness. Neurological: He is alert and oriented to person, place, and time. He displays normal reflexes. No cranial nerve deficit. He exhibits normal muscle tone. Coordination normal.   5/5 strength throughout; no past pointing; good heel to shin; negative romberg; holds both arms extended in front of them for 10 seconds without difficulty or drift; lifts legs off of bed without difficulty; no pronator drift; good equal  strength bilaterally; motor and sensory grossly intact; no facial asymmetry;      Skin: Skin is warm and dry. No rash noted. He is not diaphoretic. No erythema. Psychiatric: He has a normal mood and affect. His behavior is normal.   Nursing note and vitals reviewed.        MDM  Number of Diagnoses or Management Options  Dehydration:   Fever, unspecified fever cause:   Leukocytosis, unspecified type:   Lightheadedness:   Diagnosis management comments:   DDx: dehydration, metabolic abnormality, ACS       Amount and/or Complexity of Data Reviewed  Clinical lab tests: ordered and reviewed  Tests in the radiology section of CPT®: ordered and reviewed  Tests in the medicine section of CPT®: ordered and reviewed  Obtain history from someone other than the patient: yes (EMS)  Review and summarize past medical records: yes  Independent visualization of images, tracings, or specimens: yes    Risk of Complications, Morbidity, and/or Mortality  General comments: Pt well appearing; afebrile; clear cxr; head ct without acute abnormality; elevated wbc without any sign/source of infection; ua negative for leuks; 1+ bacterial; ucx sent; pt feeling better after iv fluids; will dc after iv fluids; will follow ucx; dc home; pt agreed to return if any worsening sx, fevers, n/v; Javier Baez MD      Patient Progress  Patient progress: stable    ED Course       Procedures      ED EKG interpretation: 17:41  Rhythm: normal sinus rhythm; and regular . Rate (approx.): 88; Axis: normal; NE Interval: normal; QRS interval: RBBB; ST/T wave: non-specific changes; This EKG was interpreted by Javier Baez MD,ED Provider. PROGRESS NOTE:  10:21 PM  Pt has been re-evaluated. Pt is feeling much better after IV fluids. Progress Note:  12:03 AM  Temperature has gone up. Heart rate is elevated. Pt re-evaluated, given tylenol, repeat IVF. Abdomen remains soft, NT, ND. Patient denies coughing, vomiting, diarrhea, constipation. No evidence of biliary colic, appendicitis, diverticulitis or PNA. Discussed with patient need for further care. Written by Bacilio Jaramillo ED Scribe, as dictated by Lana Burns MD.     01:50 AM Pt improving again. HR down, no longer tremulous. Strict RP given.      LABORATORY TESTS:  Recent Results (from the past 12 hour(s))   EKG, 12 LEAD, INITIAL    Collection Time: 07/06/17  5:41 PM   Result Value Ref Range    Ventricular Rate 88 BPM Atrial Rate 88 BPM    P-R Interval 188 ms    QRS Duration 156 ms    Q-T Interval 462 ms    QTC Calculation (Bezet) 559 ms    Calculated P Axis 37 degrees    Calculated R Axis -128 degrees    Calculated T Axis 5 degrees    Diagnosis       Normal sinus rhythm  Right bundle branch block  Abnormal ECG  When compared with ECG of 12-OCT-2016 12:46,  No significant change was found     CBC WITH AUTOMATED DIFF    Collection Time: 07/06/17  5:53 PM   Result Value Ref Range    WBC 16.5 (H) 4.1 - 11.1 K/uL    RBC 3.89 (L) 4.10 - 5.70 M/uL    HGB 13.3 12.1 - 17.0 g/dL    HCT 38.3 36.6 - 50.3 %    MCV 98.5 80.0 - 99.0 FL    MCH 34.2 (H) 26.0 - 34.0 PG    MCHC 34.7 30.0 - 36.5 g/dL    RDW 13.1 11.5 - 14.5 %    PLATELET 151 511 - 763 K/uL    NEUTROPHILS 89 (H) 32 - 75 %    LYMPHOCYTES 3 (L) 12 - 49 %    MONOCYTES 5 5 - 13 %    EOSINOPHILS 3 0 - 7 %    BASOPHILS 0 0 - 1 %    ABS. NEUTROPHILS 14.7 (H) 1.8 - 8.0 K/UL    ABS. LYMPHOCYTES 0.5 (L) 0.8 - 3.5 K/UL    ABS. MONOCYTES 0.8 0.0 - 1.0 K/UL    ABS. EOSINOPHILS 0.5 (H) 0.0 - 0.4 K/UL    ABS. BASOPHILS 0.0 0.0 - 0.1 K/UL    PLATELET COMMENTS VACUOLATED POLYS      RBC COMMENTS NORMOCYTIC, NORMOCHROMIC      WBC COMMENTS TOXIC GRANULATION     METABOLIC PANEL, COMPREHENSIVE    Collection Time: 07/06/17  5:53 PM   Result Value Ref Range    Sodium 136 136 - 145 mmol/L    Potassium 3.8 3.5 - 5.1 mmol/L    Chloride 104 97 - 108 mmol/L    CO2 23 21 - 32 mmol/L    Anion gap 9 5 - 15 mmol/L    Glucose 152 (H) 65 - 100 mg/dL    BUN 13 6 - 20 MG/DL    Creatinine 1.32 (H) 0.70 - 1.30 MG/DL    BUN/Creatinine ratio 10 (L) 12 - 20      GFR est AA >60 >60 ml/min/1.73m2    GFR est non-AA 52 (L) >60 ml/min/1.73m2    Calcium 8.7 8.5 - 10.1 MG/DL    Bilirubin, total 0.9 0.2 - 1.0 MG/DL    ALT (SGPT) 55 12 - 78 U/L    AST (SGOT) 45 (H) 15 - 37 U/L    Alk.  phosphatase 169 (H) 45 - 117 U/L    Protein, total 6.5 6.4 - 8.2 g/dL    Albumin 2.8 (L) 3.5 - 5.0 g/dL    Globulin 3.7 2.0 - 4.0 g/dL    A-G Ratio 0.8 (L) 1.1 - 2.2     CK W/ REFLX CKMB    Collection Time: 07/06/17  5:53 PM   Result Value Ref Range    CK 76 39 - 308 U/L   TROPONIN I    Collection Time: 07/06/17  5:53 PM   Result Value Ref Range    Troponin-I, Qt. <0.04 <0.05 ng/mL   URINALYSIS W/ REFLEX CULTURE    Collection Time: 07/06/17  9:02 PM   Result Value Ref Range    Color YELLOW/STRAW      Appearance CLEAR CLEAR      Specific gravity 1.024 1.003 - 1.030      pH (UA) 5.5 5.0 - 8.0      Protein 30 (A) NEG mg/dL    Glucose NEGATIVE  NEG mg/dL    Ketone 40 (A) NEG mg/dL    Bilirubin NEGATIVE  NEG      Blood TRACE (A) NEG      Urobilinogen 1.0 0.2 - 1.0 EU/dL    Nitrites NEGATIVE  NEG      Leukocyte Esterase NEGATIVE  NEG      WBC 0-4 0 - 4 /hpf    RBC 0-5 0 - 5 /hpf    Epithelial cells FEW FEW /lpf    Bacteria 1+ (A) NEG /hpf    UA:UC IF INDICATED URINE CULTURE ORDERED (A) CNI      Mucus 3+ (A) NEG /lpf       IMAGING RESULTS:  CT HEAD WO CONT   Final Result   INDICATION:   fall/syncope     EXAM:  HEAD CT WITHOUT CONTRAST     COMPARISON:  None     TECHNIQUE:  Routine noncontrast axial head CT was performed. Sagittal and  coronal reconstructions were generated.       CT dose reduction was achieved through use of a standardized protocol tailored  for this examination and automatic exposure control for dose modulation.     FINDINGS:     Ventricles:  Midline, no hydrocephalus. Intracranial Hemorrhage:  None. Brain Parenchyma/Brainstem:  Normal for age. Basal Cisterns:  Normal.   Paranasal Sinuses:  Visualized sinuses are clear. Additional Comments:  N/A.     IMPRESSION  IMPRESSION:     No acute process. XR CHEST PORT   Final Result   INDICATION:  chest pain      COMPARISON: 10/12/2016     FINDINGS: Single AP portable view of the chest obtained at 2025 demonstrates a  stable cardiomediastinal silhouette. The lungs are clear bilaterally. No osseous  abnormalities are seen.     IMPRESSION  IMPRESSION: No evidence of acute cardiopulmonary process. MEDICATIONS GIVEN:  Medications   sodium chloride 0.9 % bolus infusion 1,000 mL (not administered)   sodium chloride 0.9 % bolus infusion 1,000 mL (1,000 mL IntraVENous New Bag 7/6/17 2101)       IMPRESSION:  1. Dehydration    2. Lightheadedness    3. Leukocytosis    PLAN:  1. Current Discharge Medication List        2. Follow-up Information     Follow up With Details Comments [address] Mckoy, DO Schedule an appointment as soon as possible for a visit in 1 week  Jana Livingston  [phone]          Return to ED if worse     DISCHARGE NOTE  10:25 PM  The patient has been re-evaluated and is ready for discharge. Reviewed available results with patient. Counseled patient on diagnosis and care plan. Patient has expressed understanding, and all questions have been answered. Patient agrees with plan and agrees to follow up as recommended, or return to the ED if their symptoms worsen. Discharge instructions have been provided and explained to the patient, along with reasons to return to the ED. Attestation Note:  This note is prepared by SYLVESTER Morgan and Thea Jones, acting as Scribe for MD Jamila Calzada MD: The scribe's documentation has been prepared under my direction and personally reviewed by me in its entirety. I confirm that the note above accurately reflects all work, treatment, procedures, and medical decision making performed by me.

## 2017-07-07 NOTE — ED NOTES
Discharge instructions reviewed with patient. Discharge instructions given to patient per Dr. Anastacia Richardson. Patient able to return/verbalize discharge instructions. Copy of discharge instructions provided. Patient condition stable, respiratory status within normal limits, neuro status intact. Escorted by wheelchair out of ER, accompanied by family and RN.

## 2017-07-07 NOTE — ED NOTES
Patient with chills, temp of 99.9 orally, and . Dr. Wilson Phi aware. Orders for fluids and Tylenol received.

## 2017-07-07 NOTE — ED TRIAGE NOTES
Assumed care of pt from EMS at this time, report received. Pt arrives with c/o worsening SOB x 2-3 days, for which he was seen here yesterday for similar symptoms. Pt also noted today multiple episodes of diarrhea, vomiting, \"dry heaves. \"  EMS states pt was attempting to use restroom to have bowel movement when he acutely became SOB and defecated on himself. Pt denies any O2 use at home. Pt denies chest pain at this time. Pt states having urinary and bowel incontinence at baseline. Pt resting comfortably on the stretcher in a position of comfort.  Pt in no acute distress at this time. Pt ANOx4.  Call bell within reach.  Side rails x 2.  Cardiac monitor x 3.  Stretcher locked in the lowest position. Pt aware of plan to await for MD/PA-C/NP assessment, and pt/family verbalizes understanding.  Will continue to monitor SOB.

## 2017-07-07 NOTE — DISCHARGE INSTRUCTIONS
Dehydration: Care Instructions  Your Care Instructions  Dehydration happens when your body loses too much fluid. This might happen when you do not drink enough water or you lose large amounts of fluids from your body because of diarrhea, vomiting, or sweating. Severe dehydration can be life-threatening. Water and minerals called electrolytes help put your body fluids back in balance. Learn the early signs of fluid loss, and drink more fluids to prevent dehydration. Follow-up care is a key part of your treatment and safety. Be sure to make and go to all appointments, and call your doctor if you are having problems. It's also a good idea to know your test results and keep a list of the medicines you take. How can you care for yourself at home? · To prevent dehydration, drink plenty of fluids, enough so that your urine is light yellow or clear like water. Choose water and other caffeine-free clear liquids until you feel better. If you have kidney, heart, or liver disease and have to limit fluids, talk with your doctor before you increase the amount of fluids you drink. · If you do not feel like eating or drinking, try taking small sips of water, sports drinks, or other rehydration drinks. · Get plenty of rest.  To prevent dehydration  · Add more fluids to your diet and daily routine, unless your doctor has told you not to. · During hot weather, drink more fluids. Drink even more fluids if you exercise a lot. Stay away from drinks with alcohol or caffeine. · Watch for the symptoms of dehydration. These include:  ¨ A dry, sticky mouth. ¨ Dark yellow urine, and not much of it. ¨ Dry and sunken eyes. ¨ Feeling very tired. · Learn what problems can lead to dehydration. These include:  ¨ Diarrhea, fever, and vomiting. ¨ Any illness with a fever, such as pneumonia or the flu. ¨ Activities that cause heavy sweating, such as endurance races and heavy outdoor work in hot or humid weather.   ¨ Alcohol or drug abuse or withdrawal.  ¨ Certain medicines, such as cold and allergy pills (antihistamines), diet pills (diuretics), and laxatives. ¨ Certain diseases, such as diabetes, cancer, and heart or kidney disease. When should you call for help? Call 911 anytime you think you may need emergency care. For example, call if:  · You passed out (lost consciousness). Call your doctor now or seek immediate medical care if:  · You are confused and cannot think clearly. · You are dizzy or lightheaded, or you feel like you may faint. · You have signs of needing more fluids. You have sunken eyes and a dry mouth, and you pass only a little dark urine. · You cannot keep fluids down. Watch closely for changes in your health, and be sure to contact your doctor if:  · You are not making tears. · Your skin is very dry and sags slowly back into place after you pinch it. · Your mouth and eyes are very dry. Where can you learn more? Go to http://giovanni-mara.info/. Enter P196 in the search box to learn more about \"Dehydration: Care Instructions. \"  Current as of: March 20, 2017  Content Version: 11.3  © 6540-5464 [email or web address]. Care instructions adapted under license by [employer] (which disclaims liability or warranty for this information). If you have questions about a medical condition or this instruction, always ask your healthcare professional. [place] [address] any warranty or liability for your use of this information.

## 2017-07-07 NOTE — ED PROVIDER NOTES
HPI Comments: Ernesto Canales is a [de-identified] y.o. male, with PMHx significant for HTN and hypothyroidism, who presents via EMS to the ED [facility] ED with cc of lightheadedness, feeling off balance, and increase in LE swelling x yesterday. He states that he was seen and discharged from the ER yesterday s/p GLF without LOC in his bathroom and started on Keflex. Today he c/o feeling lightheaded, fatigued, and is dry heaving. Patient is incidentally febrile in the ED but states he has been subjectively febrile for the last 2-3 days without measuring his temperature at home. Patient specifically denies any dizziness, chest pain, diarrhea, cough, sputum production, headache, dysuria, hematuria, or new rashes. He ambulates without assist.     PSHx of prostatectomy    Social hx: + Tobacco use (former), + EtOH use, - Illicit drug use    PCP: Laina Dao. [place],     There are no other complaints, changes or physical findings at this time. The history is provided by the patient. No  was used. Past Medical History:   Diagnosis Date    DJD (degenerative joint disease) 10/11/2011    Eczema 4/28/2014    Tesfaye Dietrich MD . Derm     Elevated lipids 10/11/2011    History of prostate cancer 10/11/2011    prostatectomy ; then for recurrence, XRT    HTN (hypertension) 10/11/2011    Hypothyroidism 10/11/2011    Paroxysmal a-fib ([facility] [address]     RBBB (right bundle branch block)     Type II or unspecified type diabetes mellitus without mention of complication, not stated as uncontrolled 10/21/2013    2013.  A1C 8.5 %    Urinary incontinence 10/11/2011       Past Surgical History:   Procedure Laterality Date    HX CATARACT REMOVAL      HX OTHER SURGICAL      pilonidal cyst surgery    HX PROSTATECTOMY           Family History:   Problem Relation Age of Onset    Hypertension Mother     Hypertension Maternal Grandmother     Stroke Maternal Grandmother     Cancer Father      lung    Diabetes Other      maternal cousin       Social History     Social History    Marital status:      Spouse name: N/A    Number of children: N/A    Years of education: N/A     Occupational History    Not on file. Social History Main Topics    Smoking status: Former Smoker    Smokeless tobacco: Never Used    Alcohol use Yes      Comment: 4 drinks or less per week     Drug use: No    Sexual activity: Not on file     Other Topics Concern    Not on file     Social History Narrative         ALLERGIES: Lovastatin    Review of Systems   Constitutional: Positive for chills and fatigue. Negative for fever. HENT: Negative for congestion, ear pain, rhinorrhea and sore throat. Eyes: Negative for pain, discharge and visual disturbance. Respiratory: Negative for cough, chest tightness, shortness of breath and wheezing. Cardiovascular: Negative for chest pain, palpitations and leg swelling. Gastrointestinal: Negative for abdominal pain, constipation, diarrhea, nausea and vomiting. Genitourinary: Negative for dysuria, frequency and hematuria. Musculoskeletal: Negative for arthralgias, back pain and myalgias. Positive for feet swelling   Skin: Negative for rash. Neurological: Positive for dizziness, light-headedness and headaches. Negative for syncope and weakness. Positive for \"off balance\"   Psychiatric/Behavioral: Negative.         Patient Vitals for the past 12 hrs:   Temp Pulse Resp BP SpO2   07/07/17 2224 98.8 °F (37.1 °C) 94 22 164/78 94 %   07/07/17 1945 - 86 23 136/76 95 %   07/07/17 1930 - 87 23 123/59 95 %   07/07/17 1915 - 86 22 126/54 94 %   07/07/17 1845 (!) 101.1 °F (38.4 °C) 90 29 133/76 93 %   07/07/17 1800 - 89 23 121/56 95 %   07/07/17 1745 (!) 102.1 °F (38.9 °C) 91 26 130/41 95 %   07/07/17 1715 - 91 24 120/48 94 %   07/07/17 1700 - 93 22 107/45 94 %   07/07/17 1645 - 92 23 (!) 122/37 93 %   07/07/17 1630 - 95 25 125/49 94 %   07/07/17 1619 (!) 102.7 °F (39.3 °C) 93 16 147/64 93 % Physical Exam   Constitutional: He is oriented to person, place, and time. He appears well-developed and well-nourished. No distress. HENT:   Head: Normocephalic and atraumatic. Eyes: EOM are normal. Right eye exhibits no discharge. Left eye exhibits no discharge. No scleral icterus. Neck: Normal range of motion. Neck supple. No tracheal deviation present. Cardiovascular: Normal rate, regular rhythm, normal heart sounds and intact distal pulses. Exam reveals no gallop and no friction rub. No murmur heard. Heart rate ~90 bpm. 1+ pitting BLE edema   Pulmonary/Chest: Effort normal and breath sounds normal. No respiratory distress. He has no wheezes. He has no rales. Abdominal: Soft. He exhibits no distension. There is no tenderness. Musculoskeletal: Normal range of motion. He exhibits no edema. Lymphadenopathy:     He has no cervical adenopathy. Neurological: He is alert and oriented to person, place, and time. Skin: Skin is warm and dry. No rash noted. Psychiatric: He has a normal mood and affect. Nursing note and vitals reviewed. MDM  Number of Diagnoses or Management Options  CAP (community acquired pneumonia):   Severe sepsis [facility]):   Diagnosis management comments:     Patient presents to ED with severe sepsis - fever, tachypnea, leukocytosis, lactic acidosis. CXR suggests possible pneumonia. UA negative. Patient denies diarrhea, n/v and abdominal pain. Will treat with 30 cc/kg IVF and levofloxacin. Admit for further management.          Amount and/or Complexity of Data Reviewed  Clinical lab tests: ordered and reviewed  Tests in the radiology section of CPT®: ordered and reviewed  Tests in the medicine section of CPT®: ordered and reviewed  Review and summarize past medical records: yes  Discuss the patient with other providers: yes (Hospitalist)  Independent visualization of images, tracings, or specimens: yes    Critical Care  Total time providing critical care: 30-74 minutes    ED Course       Procedures    EKG interpretation: (Preliminary) 16:13  Rhythm: normal sinus rhythm; and regular . Rate (approx.): 94; Axis: left axis deviation; MT interval: normal; QRS interval: prolonged; ST/T wave: non-specific changes; Other findings: RBBB. unchanged from previous ekg. Written by Bianca Villalpando ED Scribe, as dictated by Eran Marcano MD.      CONSULT NOTE:  6:20 PM  Eran Marcano MD spoke with Dr. Nitza Pino,  Specialty: Hospitalist  Discussed patient's hx, disposition, and available diagnostic and imaging results. Reviewed care plans. Consultant agrees with plans as outlined. Written by Maddison Desai ED Scribe, as dictated by Eran Marcano MD.     CRITICAL CARE NOTE :    11:17 PM  IMPENDING DETERIORATION -Metabolic/Sepsis  ASSOCIATED RISK FACTORS - Metabolic changes  MANAGEMENT- Bedside Assessment and Supervision of Care  INTERPRETATION -  Xrays, ECG and Blood Pressure  INTERVENTIONS - IVF, abx  CASE REVIEW - Hospitalist, Nursing and Family  TREATMENT RESPONSE -Unchanged   PERFORMED BY - Self    NOTES:   I have spent 60 minutes of critical care time involved in lab review, consultations with specialist, family decision- making, bedside attention and documentation. During this entire length of time I was immediately available to the patient.   Eran Marcano MD     LABORATORY TESTS:  Recent Results (from the past 12 hour(s))   EKG, 12 LEAD, INITIAL    Collection Time: 07/07/17  4:13 PM   Result Value Ref Range    Ventricular Rate 94 BPM    Atrial Rate 94 BPM    P-R Interval 182 ms    QRS Duration 162 ms    Q-T Interval 448 ms    QTC Calculation (Bezet) 560 ms    Calculated P Axis 41 degrees    Calculated R Axis -72 degrees    Calculated T Axis 22 degrees    Diagnosis       Normal sinus rhythm  Left axis deviation  Right bundle branch block  When compared with ECG of 06-JUL-2017 17:41,  Questionable change in QRS axis     CBC WITH AUTOMATED DIFF Collection Time: 07/07/17  4:24 PM   Result Value Ref Range    WBC 22.2 (H) 4.1 - 11.1 K/uL    RBC 3.89 (L) 4.10 - 5.70 M/uL    HGB 13.2 12.1 - 17.0 g/dL    HCT 37.8 36.6 - 50.3 %    MCV 97.2 80.0 - 99.0 FL    MCH 33.9 26.0 - 34.0 PG    MCHC 34.9 30.0 - 36.5 g/dL    RDW 13.3 11.5 - 14.5 %    PLATELET 691 (L) 458 - 400 K/uL    NEUTROPHILS 83 %    BAND NEUTROPHILS 10 %    LYMPHOCYTES 2 %    MONOCYTES 1 %    EOSINOPHILS 4 %    BASOPHILS 0 %    ABS. NEUTROPHILS 20.7 K/UL    ABS. LYMPHOCYTES 0.4 K/UL    ABS. MONOCYTES 0.2 K/UL    ABS. EOSINOPHILS 0.9 K/UL    ABS. BASOPHILS 0.0 K/UL    PLATELET COMMENTS VACUOLATED POLYS      RBC COMMENTS NORMOCYTIC, NORMOCHROMIC     METABOLIC PANEL, COMPREHENSIVE    Collection Time: 07/07/17  4:24 PM   Result Value Ref Range    Sodium 131 (L) 136 - 145 mmol/L    Potassium 3.9 3.5 - 5.1 mmol/L    Chloride 100 97 - 108 mmol/L    CO2 22 21 - 32 mmol/L    Anion gap 9 5 - 15 mmol/L    Glucose 153 (H) 65 - 100 mg/dL    BUN 18 6 - 20 MG/DL    Creatinine 1.39 (H) 0.70 - 1.30 MG/DL    BUN/Creatinine ratio 13 12 - 20      GFR est AA 60 (L) >60 ml/min/1.73m2    GFR est non-AA 49 (L) >60 ml/min/1.73m2    Calcium 8.4 (L) 8.5 - 10.1 MG/DL    Bilirubin, total 0.9 0.2 - 1.0 MG/DL    ALT (SGPT) 76 12 - 78 U/L    AST (SGOT) 42 (H) 15 - 37 U/L    Alk.  phosphatase 175 (H) 45 - 117 U/L    Protein, total 6.6 6.4 - 8.2 g/dL    Albumin 2.7 (L) 3.5 - 5.0 g/dL    Globulin 3.9 2.0 - 4.0 g/dL    A-G Ratio 0.7 (L) 1.1 - 2.2     LACTIC ACID    Collection Time: 07/07/17  4:24 PM   Result Value Ref Range    Lactic acid 2.3 (HH) 0.4 - 2.0 MMOL/L   CK W/ CKMB & INDEX    Collection Time: 07/07/17  4:24 PM   Result Value Ref Range     (H) 39 - 308 U/L    CK - MB 2.3 <3.6 NG/ML    CK-MB Index 0.6 0 - 2.5     MAGNESIUM    Collection Time: 07/07/17  4:24 PM   Result Value Ref Range    Magnesium 1.9 1.6 - 2.4 mg/dL   TROPONIN I    Collection Time: 07/07/17  4:24 PM   Result Value Ref Range    Troponin-I, Qt. 0.08 (H) <0.05 ng/mL   URINALYSIS W/ REFLEX CULTURE    Collection Time: 07/07/17  5:43 PM   Result Value Ref Range    Color DARK YELLOW      Appearance CLOUDY (A) CLEAR      Specific gravity >1.030 (H) 1.003 - 1.030    pH (UA) 5.0 5.0 - 8.0      Protein 100 (A) NEG mg/dL    Glucose NEGATIVE  NEG mg/dL    Ketone TRACE (A) NEG mg/dL    Blood LARGE (A) NEG      Urobilinogen 1.0 0.2 - 1.0 EU/dL    Nitrites NEGATIVE  NEG      Leukocyte Esterase NEGATIVE  NEG      WBC 0-4 0 - 4 /hpf    RBC 0-5 0 - 5 /hpf    Epithelial cells FEW FEW /lpf    Bacteria NEGATIVE  NEG /hpf    UA:UC IF INDICATED CULTURE NOT INDICATED BY UA RESULT CNI      Mucus 1+ (A) NEG /lpf    Amorphous Crystals FEW (A) NEG      Hyaline cast 0-2 0 - 5 /lpf   BILIRUBIN, CONFIRM    Collection Time: 07/07/17  5:43 PM   Result Value Ref Range    Bilirubin UA, confirm NEGATIVE  NEG     TROPONIN I    Collection Time: 07/07/17  8:39 PM   Result Value Ref Range    Troponin-I, Qt. 0.10 (H) <0.05 ng/mL   LACTIC ACID    Collection Time: 07/07/17  8:39 PM   Result Value Ref Range    Lactic acid 1.2 0.4 - 2.0 MMOL/L   GLUCOSE, POC    Collection Time: 07/07/17 10:31 PM   Result Value Ref Range    Glucose (POC) 173 (H) 65 - 100 mg/dL    Performed by Sergio Villegas (PCT)        IMAGING RESULTS:  CXR Results  (Last 48 hours)               07/07/17 1716  XR CHEST PORT Final result    Impression:  IMPRESSION: Minimal left base haziness as discussed. Narrative:  EXAM: Portable CXR. 1714 hours         INDICATION: Short of breath. Sepsis       The lungs show minimal haziness at the left base likely atelectasis, without   definite pneumonic consolidation. Heart is normal in size. There is no overt   pulmonary edema. There is no evident pneumothorax, apparent adenopathy or   sizable pleural effusion. 07/06/17 2042  XR CHEST PORT Final result    Impression:  IMPRESSION: No evidence of acute cardiopulmonary process.            Narrative:  INDICATION:  chest pain COMPARISON: 10/12/2016       FINDINGS: Single AP portable view of the chest obtained at 2025 demonstrates a   stable cardiomediastinal silhouette. The lungs are clear bilaterally. No osseous   abnormalities are seen. MEDICATIONS GIVEN:  Medications   sodium chloride (NS) flush 5-10 mL (not administered)   apixaban (ELIQUIS) tablet 5 mg (not administered)   captopril (CAPOTEN) tablet 25 mg (not administered)   digoxin (LANOXIN) tablet 0.25 mg (not administered)   doxazosin (CARDURA) tablet 4 mg (not administered)   flecainide (TAMBOCOR) tablet 100 mg (not administered)   levothyroxine (SYNTHROID) tablet 25 mcg (not administered)   sodium chloride (NS) flush 5-10 mL (10 mL IntraVENous Given 7/7/17 2253)   sodium chloride (NS) flush 5-10 mL (not administered)   0.9% sodium chloride infusion (75 mL/hr IntraVENous New Bag 7/7/17 2254)   cefTRIAXone (ROCEPHIN) 1 g in 0.9% sodium chloride (MBP/ADV) 50 mL (not administered)   azithromycin (ZITHROMAX) 500 mg in 0.9% sodium chloride (MBP/ADV) 250 mL (not administered)   acetaminophen (TYLENOL) tablet 650 mg (not administered)   albuterol (PROVENTIL VENTOLIN) nebulizer solution 2.5 mg (not administered)   aspirin chewable tablet 81 mg (not administered)   glucose chewable tablet 16 g (not administered)   dextrose (D50W) injection syrg 12.5-25 g (not administered)   glucagon (GLUCAGEN) injection 1 mg (not administered)   insulin lispro (HUMALOG) injection (0 Units SubCUTAneous Held 7/7/17 2200)   sodium chloride 0.9 % bolus infusion 500 mL (0 mL IntraVENous IV Completed 7/7/17 2052)   sodium chloride 0.9 % bolus infusion 1,000 mL (1,000 mL IntraVENous New Bag 7/7/17 1727)   acetaminophen (TYLENOL) tablet 650 mg (650 mg Oral Given 7/7/17 1745)   levoFLOXacin (LEVAQUIN) 750 mg in D5W IVPB (0 mg IntraVENous IV Completed 7/7/17 2028)   sodium chloride 0.9 % bolus infusion 1,300 mL (1,300 mL IntraVENous New Bag 7/7/17 1854)       IMPRESSION:  1.  Severe sepsis ([facility]    2. CAP (community acquired pneumonia)        PLAN:  1. Admit to hospitalist.    Admit Note:  6:20 PM  Pt is being admitted by Dr. Toan Hicks. The results of their tests and reason(s) for their admission have been discussed with pt and/or available family. They convey agreement and understanding for the need to be admitted and for admission diagnosis. This note is prepared by Iris Tovar, acting as a Scribe for Sonu Hilario MD.    Sonu Hilario MD: The scribe's documentation has been prepared under my direction and personally reviewed by me in its entirety. I confirm that the notes above accurately reflects all work, treatment, procedures, and medical decision making performed by me.

## 2017-07-07 NOTE — ED NOTES
Hygiene care provided to patient: linen/sheets changed, brief change. Urine specimen obtained. Pt able to stand with x 1 assist.  Per baseline, pt is incontinent of urine.

## 2017-07-08 ENCOUNTER — APPOINTMENT (OUTPATIENT)
Dept: CT IMAGING | Age: 82
DRG: 853 | End: 2017-07-08
Attending: INTERNAL MEDICINE
Payer: MEDICARE

## 2017-07-08 LAB
ALBUMIN SERPL BCP-MCNC: 2.2 G/DL (ref 3.5–5)
ALBUMIN/GLOB SERPL: 0.6 {RATIO} (ref 1.1–2.2)
ALP SERPL-CCNC: 142 U/L (ref 45–117)
ALT SERPL-CCNC: 68 U/L (ref 12–78)
ANION GAP BLD CALC-SCNC: 10 MMOL/L (ref 5–15)
AST SERPL W P-5'-P-CCNC: 39 U/L (ref 15–37)
ATRIAL RATE: 94 BPM
BACTERIA SPEC CULT: NORMAL
BASOPHILS # BLD AUTO: 0 K/UL
BASOPHILS # BLD: 0 %
BILIRUB SERPL-MCNC: 0.5 MG/DL (ref 0.2–1)
BUN SERPL-MCNC: 19 MG/DL (ref 6–20)
BUN/CREAT SERPL: 14 (ref 12–20)
CALCIUM SERPL-MCNC: 8.5 MG/DL (ref 8.5–10.1)
CALCULATED P AXIS, ECG09: 41 DEGREES
CALCULATED R AXIS, ECG10: -72 DEGREES
CALCULATED T AXIS, ECG11: 22 DEGREES
CC UR VC: NORMAL
CHLORIDE SERPL-SCNC: 108 MMOL/L (ref 97–108)
CK SERPL-CCNC: 505 U/L (ref 39–308)
CK SERPL-CCNC: 623 U/L (ref 39–308)
CO2 SERPL-SCNC: 19 MMOL/L (ref 21–32)
CREAT SERPL-MCNC: 1.32 MG/DL (ref 0.7–1.3)
DIAGNOSIS, 93000: NORMAL
DIFFERENTIAL METHOD BLD: ABNORMAL
DIGOXIN SERPL-MCNC: <0.2 NG/ML (ref 0.9–2)
EOSINOPHIL # BLD: 0.2 K/UL
EOSINOPHIL NFR BLD: 1 %
ERYTHROCYTE [DISTWIDTH] IN BLOOD BY AUTOMATED COUNT: 13.5 % (ref 11.5–14.5)
EST. AVERAGE GLUCOSE BLD GHB EST-MCNC: 114 MG/DL
GLOBULIN SER CALC-MCNC: 3.5 G/DL (ref 2–4)
GLUCOSE BLD STRIP.AUTO-MCNC: 119 MG/DL (ref 65–100)
GLUCOSE BLD STRIP.AUTO-MCNC: 121 MG/DL (ref 65–100)
GLUCOSE BLD STRIP.AUTO-MCNC: 128 MG/DL (ref 65–100)
GLUCOSE BLD STRIP.AUTO-MCNC: 152 MG/DL (ref 65–100)
GLUCOSE SERPL-MCNC: 151 MG/DL (ref 65–100)
HBA1C MFR BLD: 5.6 % (ref 4.2–6.3)
HCT VFR BLD AUTO: 36 % (ref 36.6–50.3)
HGB BLD-MCNC: 12.3 G/DL (ref 12.1–17)
LYMPHOCYTES # BLD AUTO: 3 %
LYMPHOCYTES # BLD: 0.6 K/UL
MCH RBC QN AUTO: 33 PG (ref 26–34)
MCHC RBC AUTO-ENTMCNC: 34.2 G/DL (ref 30–36.5)
MCV RBC AUTO: 96.5 FL (ref 80–99)
MONOCYTES # BLD: 0.4 K/UL
MONOCYTES NFR BLD AUTO: 2 %
NEUTS BAND NFR BLD MANUAL: 5 %
NEUTS SEG # BLD: 18.6 K/UL
NEUTS SEG NFR BLD AUTO: 89 %
P-R INTERVAL, ECG05: 182 MS
PLATELET # BLD AUTO: 131 K/UL (ref 150–400)
POTASSIUM SERPL-SCNC: 3.6 MMOL/L (ref 3.5–5.1)
PROT SERPL-MCNC: 5.7 G/DL (ref 6.4–8.2)
Q-T INTERVAL, ECG07: 448 MS
QRS DURATION, ECG06: 162 MS
QTC CALCULATION (BEZET), ECG08: 560 MS
RBC # BLD AUTO: 3.73 M/UL (ref 4.1–5.7)
RBC MORPH BLD: ABNORMAL
SERVICE CMNT-IMP: ABNORMAL
SERVICE CMNT-IMP: NORMAL
SODIUM SERPL-SCNC: 137 MMOL/L (ref 136–145)
TROPONIN I SERPL-MCNC: 3.03 NG/ML
TROPONIN I SERPL-MCNC: 5.59 NG/ML
VENTRICULAR RATE, ECG03: 94 BPM
WBC # BLD AUTO: 19.8 K/UL (ref 4.1–11.1)

## 2017-07-08 PROCEDURE — G8978 MOBILITY CURRENT STATUS: HCPCS | Performed by: PHYSICAL THERAPIST

## 2017-07-08 PROCEDURE — 74011000258 HC RX REV CODE- 258: Performed by: FAMILY MEDICINE

## 2017-07-08 PROCEDURE — 94761 N-INVAS EAR/PLS OXIMETRY MLT: CPT

## 2017-07-08 PROCEDURE — 97116 GAIT TRAINING THERAPY: CPT | Performed by: PHYSICAL THERAPIST

## 2017-07-08 PROCEDURE — 71275 CT ANGIOGRAPHY CHEST: CPT

## 2017-07-08 PROCEDURE — 94640 AIRWAY INHALATION TREATMENT: CPT

## 2017-07-08 PROCEDURE — 74011000255 HC RX REV CODE- 255: Performed by: INTERNAL MEDICINE

## 2017-07-08 PROCEDURE — 83036 HEMOGLOBIN GLYCOSYLATED A1C: CPT | Performed by: EMERGENCY MEDICINE

## 2017-07-08 PROCEDURE — 74011250637 HC RX REV CODE- 250/637: Performed by: INTERNAL MEDICINE

## 2017-07-08 PROCEDURE — 82550 ASSAY OF CK (CPK): CPT | Performed by: FAMILY MEDICINE

## 2017-07-08 PROCEDURE — 77010033678 HC OXYGEN DAILY

## 2017-07-08 PROCEDURE — 80053 COMPREHEN METABOLIC PANEL: CPT | Performed by: FAMILY MEDICINE

## 2017-07-08 PROCEDURE — G8979 MOBILITY GOAL STATUS: HCPCS | Performed by: PHYSICAL THERAPIST

## 2017-07-08 PROCEDURE — 84484 ASSAY OF TROPONIN QUANT: CPT | Performed by: FAMILY MEDICINE

## 2017-07-08 PROCEDURE — 74011000250 HC RX REV CODE- 250: Performed by: FAMILY MEDICINE

## 2017-07-08 PROCEDURE — 85025 COMPLETE CBC W/AUTO DIFF WBC: CPT | Performed by: INTERNAL MEDICINE

## 2017-07-08 PROCEDURE — 74011636637 HC RX REV CODE- 636/637: Performed by: FAMILY MEDICINE

## 2017-07-08 PROCEDURE — 82962 GLUCOSE BLOOD TEST: CPT

## 2017-07-08 PROCEDURE — 74011000250 HC RX REV CODE- 250: Performed by: INTERNAL MEDICINE

## 2017-07-08 PROCEDURE — 77030029684 HC NEB SM VOL KT MONA -A

## 2017-07-08 PROCEDURE — 97161 PT EVAL LOW COMPLEX 20 MIN: CPT | Performed by: PHYSICAL THERAPIST

## 2017-07-08 PROCEDURE — 74011250637 HC RX REV CODE- 250/637: Performed by: FAMILY MEDICINE

## 2017-07-08 PROCEDURE — 65660000000 HC RM CCU STEPDOWN

## 2017-07-08 PROCEDURE — 74011250636 HC RX REV CODE- 250/636: Performed by: EMERGENCY MEDICINE

## 2017-07-08 PROCEDURE — 74011000258 HC RX REV CODE- 258: Performed by: INTERNAL MEDICINE

## 2017-07-08 PROCEDURE — 74011250636 HC RX REV CODE- 250/636: Performed by: INTERNAL MEDICINE

## 2017-07-08 PROCEDURE — 80162 ASSAY OF DIGOXIN TOTAL: CPT | Performed by: FAMILY MEDICINE

## 2017-07-08 PROCEDURE — 74011250636 HC RX REV CODE- 250/636: Performed by: FAMILY MEDICINE

## 2017-07-08 PROCEDURE — 74011636320 HC RX REV CODE- 636/320: Performed by: INTERNAL MEDICINE

## 2017-07-08 PROCEDURE — 77030032490 HC SLV COMPR SCD KNE COVD -B

## 2017-07-08 PROCEDURE — 36415 COLL VENOUS BLD VENIPUNCTURE: CPT | Performed by: FAMILY MEDICINE

## 2017-07-08 PROCEDURE — 94762 N-INVAS EAR/PLS OXIMTRY CONT: CPT

## 2017-07-08 PROCEDURE — 74177 CT ABD & PELVIS W/CONTRAST: CPT

## 2017-07-08 RX ORDER — FLECAINIDE ACETATE 100 MG/1
50 TABLET ORAL 2 TIMES DAILY
Status: DISCONTINUED | OUTPATIENT
Start: 2017-07-08 | End: 2017-07-12

## 2017-07-08 RX ORDER — BARIUM SULFATE 20 MG/ML
900 SUSPENSION ORAL
Status: COMPLETED | OUTPATIENT
Start: 2017-07-08 | End: 2017-07-08

## 2017-07-08 RX ORDER — SODIUM CHLORIDE 9 MG/ML
50 INJECTION, SOLUTION INTRAVENOUS
Status: COMPLETED | OUTPATIENT
Start: 2017-07-08 | End: 2017-07-08

## 2017-07-08 RX ORDER — SODIUM CHLORIDE 0.9 % (FLUSH) 0.9 %
10 SYRINGE (ML) INJECTION
Status: COMPLETED | OUTPATIENT
Start: 2017-07-08 | End: 2017-07-08

## 2017-07-08 RX ORDER — PRAVASTATIN SODIUM 10 MG/1
10 TABLET ORAL
Status: DISCONTINUED | OUTPATIENT
Start: 2017-07-08 | End: 2017-07-08

## 2017-07-08 RX ORDER — METOPROLOL SUCCINATE 25 MG/1
12.5 TABLET, EXTENDED RELEASE ORAL DAILY
Status: DISCONTINUED | OUTPATIENT
Start: 2017-07-08 | End: 2017-07-10

## 2017-07-08 RX ORDER — ENOXAPARIN SODIUM 100 MG/ML
1 INJECTION SUBCUTANEOUS EVERY 12 HOURS
Status: DISCONTINUED | OUTPATIENT
Start: 2017-07-08 | End: 2017-07-08

## 2017-07-08 RX ORDER — ONDANSETRON 2 MG/ML
4 INJECTION INTRAMUSCULAR; INTRAVENOUS
Status: DISCONTINUED | OUTPATIENT
Start: 2017-07-08 | End: 2017-07-14 | Stop reason: HOSPADM

## 2017-07-08 RX ADMIN — ACETAMINOPHEN 650 MG: 325 TABLET, FILM COATED ORAL at 00:52

## 2017-07-08 RX ADMIN — ENOXAPARIN SODIUM 90 MG: 30 INJECTION SUBCUTANEOUS at 21:00

## 2017-07-08 RX ADMIN — SODIUM CHLORIDE 75 ML/HR: 900 INJECTION, SOLUTION INTRAVENOUS at 14:52

## 2017-07-08 RX ADMIN — Medication 10 ML: at 21:27

## 2017-07-08 RX ADMIN — INSULIN LISPRO 2 UNITS: 100 INJECTION, SOLUTION INTRAVENOUS; SUBCUTANEOUS at 08:51

## 2017-07-08 RX ADMIN — FLECAINIDE ACETATE 50 MG: 100 TABLET ORAL at 19:20

## 2017-07-08 RX ADMIN — Medication 10 ML: at 12:35

## 2017-07-08 RX ADMIN — FLECAINIDE ACETATE 100 MG: 100 TABLET ORAL at 08:52

## 2017-07-08 RX ADMIN — Medication 10 ML: at 16:08

## 2017-07-08 RX ADMIN — ALBUTEROL SULFATE 2.5 MG: 2.5 SOLUTION RESPIRATORY (INHALATION) at 11:47

## 2017-07-08 RX ADMIN — APIXABAN 5 MG: 5 TABLET, FILM COATED ORAL at 08:52

## 2017-07-08 RX ADMIN — ALBUTEROL SULFATE 2.5 MG: 2.5 SOLUTION RESPIRATORY (INHALATION) at 07:13

## 2017-07-08 RX ADMIN — CEFTRIAXONE 1 G: 1 INJECTION, POWDER, FOR SOLUTION INTRAMUSCULAR; INTRAVENOUS at 00:40

## 2017-07-08 RX ADMIN — SODIUM CHLORIDE 50 ML/HR: 900 INJECTION, SOLUTION INTRAVENOUS at 14:51

## 2017-07-08 RX ADMIN — DOXYCYCLINE 100 MG: 100 INJECTION, POWDER, LYOPHILIZED, FOR SOLUTION INTRAVENOUS at 12:33

## 2017-07-08 RX ADMIN — IOPAMIDOL 100 ML: 755 INJECTION, SOLUTION INTRAVENOUS at 14:52

## 2017-07-08 RX ADMIN — Medication 10 ML: at 05:25

## 2017-07-08 RX ADMIN — CEFTRIAXONE 1 G: 1 INJECTION, POWDER, FOR SOLUTION INTRAMUSCULAR; INTRAVENOUS at 23:16

## 2017-07-08 RX ADMIN — SODIUM CHLORIDE 75 ML/HR: 900 INJECTION, SOLUTION INTRAVENOUS at 19:24

## 2017-07-08 RX ADMIN — BARIUM SULFATE 900 ML: 21 SUSPENSION ORAL at 12:32

## 2017-07-08 RX ADMIN — DIGOXIN 0.25 MG: 125 TABLET ORAL at 08:52

## 2017-07-08 RX ADMIN — ONDANSETRON 4 MG: 2 INJECTION, SOLUTION INTRAMUSCULAR; INTRAVENOUS at 00:51

## 2017-07-08 RX ADMIN — LEVOTHYROXINE SODIUM 25 MCG: 25 TABLET ORAL at 08:52

## 2017-07-08 RX ADMIN — ALBUTEROL SULFATE 2.5 MG: 2.5 SOLUTION RESPIRATORY (INHALATION) at 00:16

## 2017-07-08 RX ADMIN — ALBUTEROL SULFATE 2.5 MG: 2.5 SOLUTION RESPIRATORY (INHALATION) at 20:42

## 2017-07-08 RX ADMIN — ACETAMINOPHEN 650 MG: 325 TABLET, FILM COATED ORAL at 16:08

## 2017-07-08 RX ADMIN — ALBUTEROL SULFATE 2.5 MG: 2.5 SOLUTION RESPIRATORY (INHALATION) at 15:43

## 2017-07-08 RX ADMIN — METOPROLOL SUCCINATE 12.5 MG: 25 TABLET, EXTENDED RELEASE ORAL at 12:31

## 2017-07-08 RX ADMIN — ALBUTEROL SULFATE 2.5 MG: 2.5 SOLUTION RESPIRATORY (INHALATION) at 03:08

## 2017-07-08 NOTE — PROGRESS NOTES
Bedside and Verbal shift change report given to ayaz  [address] (oncoming nurse) by Racquel Perez RN (offgoing nurse). Report included the following information SBAR, Kardex, Intake/Output, MAR, Recent Results and Med Rec Status.

## 2017-07-08 NOTE — PROGRESS NOTES
Primary Nurse Glenda Beach RN and Adán Frost RN performed a dual skin assessment on this patient Impairment noted- see wound doc flow sheet  Dexter score is 18

## 2017-07-08 NOTE — PROGRESS NOTES
Problem: Mobility Impaired (Adult and Pediatric)  Goal: *Acute Goals and Plan of Care (Insert Text)  Physical Therapy Goals  Initiated 7/8/2017  1. Patient will move from supine to sit and sit to supine in bed with independence within 7 day(s). 2. Patient will transfer from bed to chair and chair to bed with modified independence using the least restrictive device within 7 day(s). 3. Patient will perform sit to stand with modified independence within 7 day(s). 4. Patient will ambulate with modified independence for 250 feet with the least restrictive device within 7 day(s). 5. Patient will ascend/descend 4 stairs with 1 handrail(s) with independence within 7 day(s). PHYSICAL THERAPY EVALUATION  Patient: Jasmin Alvarado (83 y.o. male)  Date: 7/8/2017  Primary Diagnosis: Pneumonia        Precautions:          ASSESSMENT :  Based on the objective data described below, the patient presents with Decreased strength/activity tolerance, decreased standing balance/tolerance, and decreased functional mobility, including bed mobility, transfers, and gait. All mobility performed while on 3L O2, as resting SpO2 is 94-95% while on 3L. He currently benefits from use of rolling walker to stabilize gait; trial of stepping without device was unsuccessful with disturbance of balance. He tolerates 140 feet (rolling walker and contact guard assist) and SpO2 is 91% after gait trial.  Depending on how he progresses in acute care, patient may be able to return home (patient indicates that his children live in [place] and would want to care for him there) with/without HHPT, or he may benefit from a short SNF stay to maximize strength/safety/independence before returning to his home alone. Patient will benefit from skilled intervention to address the above impairments.   Patients rehabilitation potential is considered to be Good  Factors which may influence rehabilitation potential include:   [X]         None noted  [ ] Mental ability/status  [ ]         Medical condition  [ ]         Home/family situation and support systems  [ ]         Safety awareness  [ ]         Pain tolerance/management  [ ]         Other:        PLAN :  Recommendations and Planned Interventions:  [X]           Bed Mobility Training             [X]    Neuromuscular Re-Education  [X]           Transfer Training                   [ ]    Orthotic/Prosthetic Training  [X]           Gait Training                         [ ]    Modalities  [X]           Therapeutic Exercises           [ ]    Edema Management/Control  [X]           Therapeutic Activities            [X]    Patient and Family Training/Education  [ ]           Other (comment):     Frequency/Duration: Patient will be followed by physical therapy  4 times a week to address goals. Discharge Recommendations: Home Health and [place]  Further Equipment Recommendations for Discharge: rolling walker (may be able to borrow one from his [employer]). SUBJECTIVE:   Patient stated I made a ramp for my dogs.  Has a ramp at home that is usable but not wheelchair accessible. OBJECTIVE DATA SUMMARY:   HISTORY:    Past Medical History:   Diagnosis Date    DJD (degenerative joint disease) 10/11/2011    Eczema 4/28/2014     Mitchel Nguyen MD . Derm     Elevated lipids 10/11/2011    History of prostate cancer 10/11/2011     prostatectomy ; then for recurrence, XRT    HTN (hypertension) 10/11/2011    Hypothyroidism 10/11/2011    Paroxysmal a-fib ([address]      RBBB (right bundle branch block)      Type II or unspecified type diabetes mellitus without mention of complication, not stated as uncontrolled 10/21/2013     2013. A1C 8.5 %    Urinary incontinence 10/11/2011     Past Surgical History:   Procedure Laterality Date    HX CATARACT REMOVAL        HX OTHER SURGICAL         pilonidal cyst surgery    HX PROSTATECTOMY         Prior Level of Function/Home Situation: Lives alone.  Female friend present (?significant other). Children live in [place]. He still drives. Was independent with ADLs/ambulation without a device. Personal factors and/or comorbidities impacting plan of care:      Home Situation  Home Environment: Private residence  # Steps to Enter: 4  Rails to Enter: Yes  Hand Rails : Bilateral (can reach both sides)  Wheelchair Ramp: Yes (ramp, too small for wheelchair)  One/Two Story Residence: One story (4 stairs to living room)  Living Alone: Yes  Support Systems: Child(lupillo), Friends \ neighbors  Patient Expects to be Discharged to[de-identified] Private residence  Tub or Shower Type: Tub/Shower combination     EXAMINATION/PRESENTATION/DECISION MAKING:   Critical Behavior:  Neurologic State: Alert  Orientation Level: Appropriate for age, Oriented X4  Cognition: Appropriate decision making, Appropriate safety awareness, Follows commands  Safety/Judgement: Awareness of environment, Good awareness of safety precautions, Fall prevention, Insight into deficits  Hearing: Auditory  Auditory Impairment: None  Skin:  + glasses, 3L O2 via nasal cannula, IV left UE.   Edema: bilateral distal LEs  Range Of Motion:  AROM: Within functional limits                       Strength:    Strength: Generally decreased, functional                    Tone & Sensation:   Tone: Normal              Sensation: Intact               Coordination:  Coordination: Generally decreased, functional  Vision:      Functional Mobility:  Bed Mobility:     Supine to Sit: Supervision        Transfers:  Sit to Stand: Contact guard assistance;Assist x1  Stand to Sit: Contact guard assistance;Assist x1        Bed to Chair: Contact guard assistance;Assist x1              Balance:   Sitting: Intact  Standing: Impaired  Standing - Static: Constant support;Good  Standing - Dynamic : Fair  Ambulation/Gait Training:  Distance (ft): 140 Feet (ft)  Assistive Device: Gait belt;Walker, rolling  Ambulation - Level of Assistance: Contact guard assistance Gait Description (WDL): Exceptions to WDL  Gait Abnormalities: Decreased step clearance        Base of Support: Narrowed     Speed/Muriel: Pace decreased (<100 feet/min)                                         Therapeutic Exercises:   AROM both UE/LEs prior to mobility training     Functional Measure:     Elder Mobility Scale      15/20            EMS and G-code impairment scale:  Percentage of impairment CH  0% CI  1-19% CJ  20-39% CK  40-59% CL  60-79% CM  80-99% CN  100%   EMS Score 0-20 20 17-19 13-16 9-12 5-8 1-4 0      Scores under 10  generally these patients are dependent in mobility maneuvers; require help with  basic ADL, such as transfers, toileting and dressing. Scores between 10  13  generally these patients are borderline in terms of safe mobility and  independence in ADL i.e. they require some help with some mobility maneuvers. Scores over 14  Generally these patients are able to perform mobility maneuvers alone and safely  and are independent in basic ADL. G codes: In compliance with CMSs Claims Based Outcome Reporting, the following G-code set was chosen for this patient based on their primary functional limitation being treated: The outcome measure chosen to determine the severity of the functional limitation was the Elder Mobility Scale with a score of 15/20 which was correlated with the impairment scale.       · Mobility - Walking and Moving Around:               - CURRENT STATUS:    CJ - 20%-39% impaired, limited or restricted               - GOAL STATUS:           CI - 1%-19% impaired, limited or restricted               - D/C STATUS:                       ---------------To be determined---------------      Physical Therapy Evaluation Charge Determination   History Examination Presentation Decision-Making   LOW Complexity : Zero comorbidities / personal factors that will impact the outcome / POC LOW Complexity : 1-2 Standardized tests and measures addressing body structure, function, activity limitation and / or participation in recreation  LOW Complexity : Stable, uncomplicated  LOW Complexity : FOTO score of       Based on the above components, the patient evaluation is determined to be of the following complexity level: LOW      Pain:  Pain Scale 1: Numeric (0 - 10)  Pain Intensity 1: 0              Activity Tolerance:   Fair while on 3L O2. SpO2 was 91% within 1 minute after gait trial.  Please refer to the flowsheet for vital signs taken during this treatment. After treatment:   [X]         Patient left in no apparent distress sitting up in chair  [ ]         Patient left in no apparent distress in bed  [X]         Call bell left within reach  [X]         Nursing notified  [ ]         Caregiver present  [X]         Bed alarm activated      COMMUNICATION/EDUCATION:   The patients plan of care was discussed with: Registered Nurse.  [X]         Fall prevention education was provided and the patient/caregiver indicated understanding. [X]         Patient/family have participated as able in goal setting and plan of care. [X]         Patient/family agree to work toward stated goals and plan of care. [ ]         Patient understands intent and goals of therapy, but is neutral about his/her participation. [ ]         Patient is unable to participate in goal setting and plan of care.      Thank you for this referral.  Lady Modi, PT   Time Calculation: 20 mins

## 2017-07-08 NOTE — CONSULTS
Consult    NAME: Nahomy Panchal   :  1935   MRN:  [id number]     Date/Time:  2017 10:36 AM    Patient PCP: Hulon Hammans. Willard, DO  ________________________________________________________________________     Assessment:     1. Dyspnea, syncope, febrile, ?pneumonia, leukoctyosis, increased troponin suggestive of NSTEMI  2. Stress in  negative  3. Echo 2017 EF 60%  4. Prox Afib/aflutter with RBBB/lAHB on flecainide, dig stopped in past due to bradycardia  5. DM  6. HTN  7. Dyslipidemia  8. CKD 1.3 Dr. Veronica Bowens  9. DANIELA in  ok  10. Hypothyroid  11. Prostate CA  12. , very mild functional capacity  13. Cardiologist:  Dr. Marium Pulliam        Plan:     Unusual presentation. Dyspnea, mild cough, not productive, CT suggest pneumonia, febrile, leukocytosis, on Abx. No chest pain, no acute ECG changes, but increased troponin. No edema, does not appear to be in CHF. Sinus so far, hx of bradycardia    Discussed with Dr. Nam Dyer for CT of chest and abdomen with PO and IV contrast to rule out PE and look for source of infection. Check echo. Consider future ischemic work up. 1. Change eliquis to lovenox while in hospital.  2. Add ASA  3. Decrease flecainide to 50mg bid  4. Add low dose metoprolol  5. Hold captropril for now  6. Gentle hydration  7. Resume pravastatin once ck normalizes  . [x]        High complexity decision making was performed        Subjective:   CHIEF COMPLAINT: Dyspnea    HISTORY OF PRESENT ILLNESS:        Nahomy Panchal is a [de-identified] y.o. male, with PMHx significant for HTN and hypothyroidism, who presents via EMS to the ED [facility] ED with cc of lightheadedness, feeling off balance, and increase in LE swelling x yesterday. He states that he was seen and discharged from the ER yesterday s/p GLF without LOC in his bathroom and started on Keflex. Today he c/o feeling lightheaded, fatigued, and is dry heaving.  Patient is incidentally febrile in the ED but states he has been subjectively febrile for the last 2-3 days without measuring his temperature at home. Patient specifically denies any dizziness, chest pain, diarrhea, cough, sputum production, headache, dysuria, hematuria, or new rashes. He ambulates without assist.     Dyspnea.  +cough, no sputum. No edema. No chest pain. Tele so far ok. Feeling a little better. We were asked to consult for work up and evaluation of the above problems. Past Medical History:   Diagnosis Date    DJD (degenerative joint disease) 10/11/2011    Eczema 4/28/2014    Laureano Barreto MD . Derm     Elevated lipids 10/11/2011    History of prostate cancer 10/11/2011    prostatectomy ; then for recurrence, XRT    HTN (hypertension) 10/11/2011    Hypothyroidism 10/11/2011    Paroxysmal a-fib ([address]     RBBB (right bundle branch block)     Type II or unspecified type diabetes mellitus without mention of complication, not stated as uncontrolled 10/21/2013    2013. A1C 8.5 %    Urinary incontinence 10/11/2011      Past Surgical History:   Procedure Laterality Date    HX CATARACT REMOVAL      HX OTHER SURGICAL      pilonidal cyst surgery    HX PROSTATECTOMY       Allergies   Allergen Reactions    Lovastatin Other (comments)     Foot pain       Meds:  See below  Social History   Substance Use Topics    Smoking status: Former Smoker    Smokeless tobacco: Never Used    Alcohol use Yes      Comment: 4 drinks or less per week       Family History   Problem Relation Age of Onset    Hypertension Mother     Hypertension Maternal Grandmother     Stroke Maternal Grandmother     Cancer Father      lung    Diabetes Other      maternal cousin       REVIEW OF SYSTEMS:     []         Unable to obtain  ROS due to ---   [x]         Total of 12 systems reviewed as follows:     Total of 12 systems reviewed as follows:       POSITIVE= Bold text  Negative = normal text  General:  fever, chills, sweats, generalized weakness, weight loss/gain,      loss of appetite   Eyes:    blurred vision, eye pain, loss of vision, double vision  ENT:    rhinorrhea, pharyngitis   Respiratory:   cough, sputum production, SOB, STILL, wheezing, pleuritic pain   Cardiology:   chest pain, palpitations, orthopnea, PND, edema, syncope   Gastrointestinal:  abdominal pain , N/V, diarrhea, dysphagia, constipation, bleeding   Genitourinary:  frequency, urgency, dysuria, hematuria, incontinence   Muskuloskeletal :  arthralgia, myalgia, back pain  Hematology:  easy bruising, nose or gum bleeding, lymphadenopathy   Dermatological: rash, ulceration, pruritis, color change / jaundice  Endocrine:   hot flashes or polydipsia   Neurological:  headache, dizziness, confusion, focal weakness, paresthesia,     Speech difficulties, memory loss, gait difficulty  Psychological: Feelings of anxiety, depression, agitation    Objective:      Physical Exam:    Last 24hrs VS reviewed since prior progress note. Most recent are:    Visit Vitals    /78    Pulse 93    Temp 98.5 °F (36.9 °C)    Resp 21    Ht 6' 2\" (1.88 m)    Wt 92.1 kg (203 lb)    SpO2 92%    BMI 26.06 kg/m2       Intake/Output Summary (Last 24 hours) at 07/08/17 1036  Last data filed at 07/08/17 0421   Gross per 24 hour   Intake           408.75 ml   Output              300 ml   Net           108.75 ml        General Appearance: Well developed, well nourished, alert & oriented x 3,    no acute distress. Ears/Nose/Mouth/Throat: Pupils equal and round, Hearing grossly normal.  Neck: Supple. JVP within normal limits. Carotids good upstrokes, with no bruit. Chest: Lungs clear to auscultation bilaterally. Cardiovascular: Regular rate and rhythm, S1S2 normal, no murmur, rubs, gallops. Abdomen: Soft, non-tender, bowel sounds are active. No organomegaly. Extremities: No edema bilaterally. Femoral pulses +1, Distal Pulses +1. Skin: Warm and dry. Neuro: CN II-XII grossly intact, Strength and sensation grossly intact.     Data:      Prior to Admission medications    Medication Sig Start Date End Date Taking? Authorizing Provider   captopril (CAPOTEN) 50 mg tablet Take 25 mg by mouth two (2) times a day. Yes Milly Gipson, MD   cephALEXin (KEFLEX) 500 mg capsule Take 500 mg by mouth three (3) times daily. Yes Milly Gipson, MD   doxazosin (CARDURA) 4 mg tablet Take 4 mg by mouth daily. Yes Milly Gipson MD   apixaban (ELIQUIS) 5 mg tablet Take 5 mg by mouth two (2) times a day. Yes Historical Provider   levothyroxine (SYNTHROID) 25 mcg tablet TAKE 1 TABLET DAILY 8/18/14  Yes Mercedescecile Huntley IV, MD   flecainide (TAMBOCOR) 100 mg tablet Take 100 mg by mouth two (2) times a day. Yes Historical Provider       Recent Results (from the past 24 hour(s))   EKG, 12 LEAD, INITIAL    Collection Time: 07/07/17  4:13 PM   Result Value Ref Range    Ventricular Rate 94 BPM    Atrial Rate 94 BPM    P-R Interval 182 ms    QRS Duration 162 ms    Q-T Interval 448 ms    QTC Calculation (Bezet) 560 ms    Calculated P Axis 41 degrees    Calculated R Axis -72 degrees    Calculated T Axis 22 degrees    Diagnosis       Normal sinus rhythm  Left axis deviation  Right bundle branch block  When compared with ECG of 06-JUL-2017 17:41,  Questionable change in QRS axis     CBC WITH AUTOMATED DIFF    Collection Time: 07/07/17  4:24 PM   Result Value Ref Range    WBC 22.2 (H) 4.1 - 11.1 K/uL    RBC 3.89 (L) 4.10 - 5.70 M/uL    HGB 13.2 12.1 - 17.0 g/dL    HCT 37.8 36.6 - 50.3 %    MCV 97.2 80.0 - 99.0 FL    MCH 33.9 26.0 - 34.0 PG    MCHC 34.9 30.0 - 36.5 g/dL    RDW 13.3 11.5 - 14.5 %    PLATELET 000 (L) 217 - 400 K/uL    NEUTROPHILS 83 %    BAND NEUTROPHILS 10 %    LYMPHOCYTES 2 %    MONOCYTES 1 %    EOSINOPHILS 4 %    BASOPHILS 0 %    ABS. NEUTROPHILS 20.7 K/UL    ABS. LYMPHOCYTES 0.4 K/UL    ABS. MONOCYTES 0.2 K/UL    ABS. EOSINOPHILS 0.9 K/UL    ABS.  BASOPHILS 0.0 K/UL    PLATELET COMMENTS VACUOLATED POLYS      RBC COMMENTS NORMOCYTIC, NORMOCHROMIC     METABOLIC PANEL, COMPREHENSIVE    Collection Time: 07/07/17  4:24 PM   Result Value Ref Range    Sodium 131 (L) 136 - 145 mmol/L    Potassium 3.9 3.5 - 5.1 mmol/L    Chloride 100 97 - 108 mmol/L    CO2 22 21 - 32 mmol/L    Anion gap 9 5 - 15 mmol/L    Glucose 153 (H) 65 - 100 mg/dL    BUN 18 6 - 20 MG/DL    Creatinine 1.39 (H) 0.70 - 1.30 MG/DL    BUN/Creatinine ratio 13 12 - 20      GFR est AA 60 (L) >60 ml/min/1.73m2    GFR est non-AA 49 (L) >60 ml/min/1.73m2    Calcium 8.4 (L) 8.5 - 10.1 MG/DL    Bilirubin, total 0.9 0.2 - 1.0 MG/DL    ALT (SGPT) 76 12 - 78 U/L    AST (SGOT) 42 (H) 15 - 37 U/L    Alk.  phosphatase 175 (H) 45 - 117 U/L    Protein, total 6.6 6.4 - 8.2 g/dL    Albumin 2.7 (L) 3.5 - 5.0 g/dL    Globulin 3.9 2.0 - 4.0 g/dL    A-G Ratio 0.7 (L) 1.1 - 2.2     LACTIC ACID    Collection Time: 07/07/17  4:24 PM   Result Value Ref Range    Lactic acid 2.3 (HH) 0.4 - 2.0 MMOL/L   CK W/ CKMB & INDEX    Collection Time: 07/07/17  4:24 PM   Result Value Ref Range     (H) 39 - 308 U/L    CK - MB 2.3 <3.6 NG/ML    CK-MB Index 0.6 0 - 2.5     MAGNESIUM    Collection Time: 07/07/17  4:24 PM   Result Value Ref Range    Magnesium 1.9 1.6 - 2.4 mg/dL   TROPONIN I    Collection Time: 07/07/17  4:24 PM   Result Value Ref Range    Troponin-I, Qt. 0.08 (H) <0.05 ng/mL   URINALYSIS W/ REFLEX CULTURE    Collection Time: 07/07/17  5:43 PM   Result Value Ref Range    Color DARK YELLOW      Appearance CLOUDY (A) CLEAR      Specific gravity >1.030 (H) 1.003 - 1.030    pH (UA) 5.0 5.0 - 8.0      Protein 100 (A) NEG mg/dL    Glucose NEGATIVE  NEG mg/dL    Ketone TRACE (A) NEG mg/dL    Blood LARGE (A) NEG      Urobilinogen 1.0 0.2 - 1.0 EU/dL    Nitrites NEGATIVE  NEG      Leukocyte Esterase NEGATIVE  NEG      WBC 0-4 0 - 4 /hpf    RBC 0-5 0 - 5 /hpf    Epithelial cells FEW FEW /lpf    Bacteria NEGATIVE  NEG /hpf    UA:UC IF INDICATED CULTURE NOT INDICATED BY UA RESULT CNI      Mucus 1+ (A) NEG /lpf    Amorphous Crystals FEW (A) NEG Hyaline cast 0-2 0 - 5 /lpf   BILIRUBIN, CONFIRM    Collection Time: 07/07/17  5:43 PM   Result Value Ref Range    Bilirubin UA, confirm NEGATIVE  NEG     TROPONIN I    Collection Time: 07/07/17  8:39 PM   Result Value Ref Range    Troponin-I, Qt. 0.10 (H) <0.05 ng/mL   LACTIC ACID    Collection Time: 07/07/17  8:39 PM   Result Value Ref Range    Lactic acid 1.2 0.4 - 2.0 MMOL/L   GLUCOSE, POC    Collection Time: 07/07/17 10:31 PM   Result Value Ref Range    Glucose (POC) 173 (H) 65 - 100 mg/dL    Performed by Angel Stone (PCT)    HEMOGLOBIN A1C WITH EAG    Collection Time: 07/08/17  4:24 AM   Result Value Ref Range    Hemoglobin A1c 5.6 4.2 - 6.3 %    Est. average glucose 114 mg/dL   DIGOXIN    Collection Time: 07/08/17  4:24 AM   Result Value Ref Range    DIGOXIN <0.2 (L) 0.90 - 7.61 NG/ML   METABOLIC PANEL, COMPREHENSIVE    Collection Time: 07/08/17  4:24 AM   Result Value Ref Range    Sodium 137 136 - 145 mmol/L    Potassium 3.6 3.5 - 5.1 mmol/L    Chloride 108 97 - 108 mmol/L    CO2 19 (L) 21 - 32 mmol/L    Anion gap 10 5 - 15 mmol/L    Glucose 151 (H) 65 - 100 mg/dL    BUN 19 6 - 20 MG/DL    Creatinine 1.32 (H) 0.70 - 1.30 MG/DL    BUN/Creatinine ratio 14 12 - 20      GFR est AA >60 >60 ml/min/1.73m2    GFR est non-AA 52 (L) >60 ml/min/1.73m2    Calcium 8.5 8.5 - 10.1 MG/DL    Bilirubin, total 0.5 0.2 - 1.0 MG/DL    ALT (SGPT) 68 12 - 78 U/L    AST (SGOT) 39 (H) 15 - 37 U/L    Alk.  phosphatase 142 (H) 45 - 117 U/L    Protein, total 5.7 (L) 6.4 - 8.2 g/dL    Albumin 2.2 (L) 3.5 - 5.0 g/dL    Globulin 3.5 2.0 - 4.0 g/dL    A-G Ratio 0.6 (L) 1.1 - 2.2     TROPONIN I    Collection Time: 07/08/17  4:24 AM   Result Value Ref Range    Troponin-I, Qt. 5.59 (H) <0.05 ng/mL   CBC WITH AUTOMATED DIFF    Collection Time: 07/08/17  4:24 AM   Result Value Ref Range    WBC 19.8 (H) 4.1 - 11.1 K/uL    RBC 3.73 (L) 4.10 - 5.70 M/uL    HGB 12.3 12.1 - 17.0 g/dL    HCT 36.0 (L) 36.6 - 50.3 %    MCV 96.5 80.0 - 99.0 FL    MCH 33.0 26.0 - 34.0 PG    MCHC 34.2 30.0 - 36.5 g/dL    RDW 13.5 11.5 - 14.5 %    PLATELET 303 (L) 337 - 400 K/uL    NEUTROPHILS 89 %    BAND NEUTROPHILS 5 %    LYMPHOCYTES 3 %    MONOCYTES 2 %    EOSINOPHILS 1 %    BASOPHILS 0 %    ABS. NEUTROPHILS 18.6 K/UL    ABS. LYMPHOCYTES 0.6 K/UL    ABS. MONOCYTES 0.4 K/UL    ABS. EOSINOPHILS 0.2 K/UL    ABS.  BASOPHILS 0.0 K/UL    RBC COMMENTS NORMOCYTIC, NORMOCHROMIC      DF MANUAL     CK    Collection Time: 07/08/17  4:24 AM   Result Value Ref Range     (H) 39 - 308 U/L   GLUCOSE, POC    Collection Time: 07/08/17  8:26 AM   Result Value Ref Range    Glucose (POC) 152 (H) 65 - 100 mg/dL    Performed by Griffin Kulkarni    TROPONIN I    Collection Time: 07/08/17  9:15 AM   Result Value Ref Range    Troponin-I, Qt. 3.03 (H) <0.05 ng/mL   CK    Collection Time: 07/08/17  9:15 AM   Result Value Ref Range     (H) 39 - 308 U/L

## 2017-07-08 NOTE — ED NOTES
TRANSFER - OUT REPORT:    Verbal report given to [facility], RN(name) on Anum Knox  being transferred to [facility](unit) for routine progression of care       Report consisted of patients Situation, Background, Assessment and   Recommendations(SBAR). Information from the following report(s) SBAR, Kardex, ED Summary, MAR, Recent Results and Med Rec Status was reviewed with the receiving nurse. Lines:   Peripheral IV 07/07/17 Left Antecubital (Active)   Site Assessment Clean, dry, & intact 7/7/2017  5:28 PM   Phlebitis Assessment 0 7/7/2017  5:28 PM   Infiltration Assessment 0 7/7/2017  5:28 PM   Dressing Status Clean, dry, & intact 7/7/2017  5:28 PM   Dressing Type Transparent 7/7/2017  5:28 PM   Hub Color/Line Status Patent; Flushed 7/7/2017  5:28 PM   Action Taken Blood drawn 7/7/2017  5:28 PM   Alcohol Cap Used Yes 7/7/2017  5:28 PM        Opportunity for questions and clarification was provided.       Patient transported with:   [employer]

## 2017-07-08 NOTE — PROGRESS NOTES
Bedside shift change report given to Parmjit Romero (oncoming nurse) by Duran Nye (offgoing nurse). Report included the following information SBAR, Kardex, Intake/Output, MAR and Recent Results.

## 2017-07-08 NOTE — PROGRESS NOTES
Hospitalist Progress Note    NAME: Giancarlo Jauregui   :  1935   MRN:  [id number]     Admit date: 2017    Today's date: 17    PCP: Elvia Gilliam. Chante Jewell M.D. Cell [phone]      Assessment / Plan:    Severe sepsis POA WBC 22,200, HR 93, temp 102.7, lactate 2.3 . Possible bilateral lower lobe pneumonia POA  Diarrhea POA  Presents with nonspecific weakness, increasing SOB, mild cough  CT chest with some ASD at the bases, reviewed scan today, very subtle  UA negative for UA  Diarrhea/loosestools, check CT scan abdomen/pelvis and stool studies. IVF  IV ceftriaxone and doxycycline(stop azithromycin on the flecainide)  Follow up blood cultures  Serial labs    Non ST elevation MI POA peak troponin 5.59  Essential HTN POA  Chronic atrial fibrillation POA  D/W Dr Geneva Armstrong  ASA, change to lovenox  Lopressor  Hold cardura, captopril with borderline O2  Dr Geneva Armstrong questions whether pt could have had a PE despite the eliquis with the SOB and elevated troponin  Serial troponins  Echo  ? May need a cath  Check a CTA    Lightheadedness/fall POA  [facility] CT scan negative   PT/OT consults    Diabetes type 2 POA HgBa1c 5.6  SSI  HgBa1c 173, 152    Code status: Full  Prophylaxis: Lovenox  Recommended Disposition: Home w/Family     Subjective:     Chief Complaint / Reason for Physician Visit f/u severe sepsis  \"I have had 4 episodes of diarrhea overnight\". Increasing SOB past 24 hours, very mild cough  No SSCP or abdominal pain  4 episodes of diarrhea at home  No HA or N/V    Discussed with RN events overnight.      Review of Systems:  Symptom Y/N Comments  Symptom Y/N Comments   Fever/Chills y   Chest Pain n    Poor Appetite    Edema     Cough y Mild  Abdominal Pain n    Sputum    Joint Pain     SOB/STILL n   Pruritis/Rash     Nausea/vomit n   Tolerating PT/OT     Diarrhea Y   Tolerating Diet Yes    Constipation    Other       Could NOT obtain due to:      Objective:     VITALS: Last 24hrs VS reviewed since prior progress note. Most recent are:  Patient Vitals for the past 24 hrs:   Temp Pulse Resp BP SpO2   07/08/17 0824 - 93 - 106/78 -   07/08/17 0748 98.5 °F (36.9 °C) 93 21 104/65 92 %   07/08/17 0712 - - - - 95 %   07/08/17 0414 99.4 °F (37.4 °C) 94 20 110/58 93 %   07/08/17 0308 - - - - 93 %   07/08/17 0016 - - - - 91 %   07/08/17 0000 - - - - 92 %   07/07/17 2224 98.8 °F (37.1 °C) 94 22 164/78 94 %   07/07/17 1945 - 86 23 136/76 95 %   07/07/17 1930 - 87 23 123/59 95 %   07/07/17 1915 - 86 22 126/54 94 %   07/07/17 1845 (!) 101.1 °F (38.4 °C) 90 29 133/76 93 %   07/07/17 1800 - 89 23 121/56 95 %   07/07/17 1745 (!) 102.1 °F (38.9 °C) 91 26 130/41 95 %   07/07/17 1715 - 91 24 120/48 94 %   07/07/17 1700 - 93 22 107/45 94 %   07/07/17 1645 - 92 23 (!) 122/37 93 %   07/07/17 1630 - 95 25 125/49 94 %   07/07/17 1619 (!) 102.7 °F (39.3 °C) 93 16 147/64 93 %       Intake/Output Summary (Last 24 hours) at 07/08/17 1030  Last data filed at 07/08/17 0421   Gross per 24 hour   Intake           408.75 ml   Output              300 ml   Net           108.75 ml        Wt Readings from Last 12 Encounters:   07/07/17 92.1 kg (203 lb)   07/06/17 92.1 kg (203 lb)   10/12/16 86.8 kg (191 lb 5.8 oz)   05/11/15 90.3 kg (199 lb)   09/18/14 91.6 kg (202 lb)   04/28/14 91.6 kg (202 lb)   11/25/13 90.7 kg (200 lb)   10/29/13 91.6 kg (202 lb)   10/21/13 92.1 kg (203 lb)   04/15/13 91.6 kg (202 lb)   10/15/12 88.9 kg (196 lb)   04/11/12 87.5 kg (193 lb)       PHYSICAL EXAM:  General: WD, WN. Alert, cooperative, no acute distress    EENT:  PERRL. Anicteric sclerae. MMM  Neck:  No meningismus, no thyromegaly  Resp:  Crackles at bases bilaterally, no wheezing.   No accessory muscle use  CV:  Regular  rhythm,  No edema  GI:  Soft, mildly distended, Non tender.  +Bowel sounds, no rebound  LN:  No cervical or inguinal ROX  Neurologic:  Alert and oriented X 3, normal speech, non focal motor exam  Psych:   Good insight. Not anxious nor agitated  MS:  No joint swelling or erythema  Skin:  No rashes. No jaundice    Reviewed most current lab test results and cultures  YES  Reviewed most current radiology test results   YES  Review and summation of old records today    NO  Reviewed patient's current orders and MAR    YES  PMH/SH reviewed - no change compared to H&P  ________________________________________________________________________  Care Plan discussed with:    Comments   Patient x    Family  x Ex-wife   RN x    Care Manager     Consultant  x Dr Luigi Logan team rounds were held today with , nursing, pharmacist and clinical coordinator. Patient's plan of care was discussed; medications were reviewed and discharge planning was addressed. ________________________________________________________________________  Total NON critical care TIME: 35   Minutes    Total CRITICAL CARE TIME Spent:   Minutes non procedure based      Comments   >50% of visit spent in counseling and coordination of care     ________________________________________________________________________  Domenica Francois MD     Procedures: see electronic medical records for all procedures/Xrays and details which were not copied into this note but were reviewed prior to creation of Plan. LABS:  I reviewed today's most current labs and imaging studies.   Pertinent labs include:  Recent Labs      07/08/17   0424  07/07/17   1624  07/06/17   1753   WBC  19.8*  22.2*  16.5*   HGB  12.3  13.2  13.3   HCT  36.0*  37.8  38.3   PLT  131*  149*  179     Recent Labs      07/08/17   0424  07/07/17   1624  07/06/17   1753   NA  137  131*  136   K  3.6  3.9  3.8   CL  108  100  104   CO2  19*  22  23   GLU  151*  153*  152*   BUN  19  18  13   CREA  1.32*  1.39*  1.32*   CA  8.5  8.4*  8.7   MG   --   1.9   --    ALB  2.2*  2.7*  2.8*   TBILI  0.5  0.9  0.9   SGOT  39*  42*  45*   ALT  68  76  55

## 2017-07-08 NOTE — H&P
[facility]   [place], [address]   HISTORY AND PHYSICAL       Name:  Warren Henao   MR#:  [id number]   :  1935   Account #:  [de-identified]        Date of Adm:  2017       CHIEF COMPLAINT: Dizziness and fever. HISTORY OF PRESENT ILLNESS: The patient is an 28-year-old   gentleman with past medical history of diabetes, diabetic nephropathy,   hypertension, hypothyroidism, paroxysmal atrial fibrillation and BPH,   who presents to the hospital with the above-mentioned symptoms. History was obtained from the patient as well as the patient's wife, who   was present at the bedside. The patient reports that he had presented   to the ER yesterday secondary to a ground-level fall due to   lightheadedness and \"foot feeling off balance. \"  The patient reports that   workup was done yesterday and he was told that he may have a UTI   and was discharged home on Keflex. The patient reports that he went   home, continued to be lightheaded, felt significantly \"off balance,\"   was walking by holding the walls. Also has cough and the wife reports   that he was extremely short of breath. The wife also reports that the   patient had had some swelling in his legs yesterday which has   resolved today. The patient reports that he has neuropathy and had had   extensive workup for bilateral lower extremity neuropathy. The patient   reports that his cough is nonproductive in nature. He denies any   headache or blurry vision associated with the symptoms. The patient   also denies any photophobia, trouble swallowing, trouble with speech,   any chest pain, abdominal pain, constipation, diarrhea, urinary   symptoms, focal or generalized neurological weakness besides the   symptoms mentioned above, recent travel, sick contacts or any other   concerns or problems.  The patient reports currently he does not have   any pain and did not hurt himself when he had the fall yesterday. The   patient also reports that he is incontinent of bladder and bowel and has   been going on for many years. The patient was found to have a fever of   101.1 degrees Fahrenheit in the ER and was requested to be admitted   to the hospital for management and evaluation. The patient denies any   other complaints or problems. PAST SURGICAL HISTORY: The patient has past surgical history of   prostatitis. SOCIAL HISTORY: The patient is a former smoker, drinks 2-4 drinks   every week. Denies IV drug abuse. Lives at home. ALLERGIES: LOVASTATIN. FAMILY HISTORY: Discussed. Mother had history of hypertension,   maternal grandmother had history of hypertension and maternal   grandmother had history of stroke. REVIEW OF SYMPTOMS: An all system review was done which was   essentially negative, except for the symptoms mentioned above in the   HPI. PHYSICAL EXAMINATION   VITAL SIGNS: Temperature 101.1, pulse 90, respiratory rate 29, blood   pressure 133/76, pulse oximetry 93% on 1 liter oxygen. GENERAL: Alert x3. Awake, mildly distressed, pleasant male, appears   to be stated age. HEENT: Pupils equal and reactive to light, dry mucous membranes,   tympanic membranes clear. NECK: Supple. CHEST: Decreased basal breath sounds. CORONARY: S1, S2 were heard. ABDOMEN: Soft, nontender, nondistended. Bowel sounds are   physiological.   EXTREMITIES: No clubbing, no cyanosis, no edema. NEUROPSYCHIATRIC: Pleasant mood and affect. Cranial nerves II   through XII grossly intact. Sensory decreased bilateral lower   extremities. Moves all 4, strength 5/5. SKIN: Warm. LABORATORIES: White count 22.2, hemoglobin 13.2, hematocrit   37.8, platelets 607. Urine shows trace ketones, large amount of blood,   negative for nitrite, negative for bacteria. Sodium 131, potassium 3.9,   chloride 100, bicarb 22, glucose 153, BUN 18, creatinine 1.39, calcium   8.4, magnesium 1.9.  Bilirubin total 0.9, albumin 2.7, ALT 76, AST 42,   alkaline phosphatase 175. , CK-MB 2.3. Troponin 0.08. Blood   cultures have been pending. CT of the head that was done yesterday   was essentially negative. X-ray of the chest shows minimal left base   haziness as discussed. EKG shows normal sinus rhythm with left axis   deviation and nonspecific ST changes. ASSESSMENT AND PLAN   1. Fever with leukocytosis and lactic acidosis. Concern for occult   pneumonia. The patient will be admitted on a telemetry bed. Will start   the patient on broad-spectrum IV antibiotics, DuoNebs, gentle IV   hydration, oxygen support and continue to monitor. Blood cultures have   been drawn. Provide continuous pulse oximetry monitoring and further   intervention will be per hospital course. Will also continue to monitor   lactic acid levels. Urine culture and blood cultures have been obtained. Will also get a right upper quadrant ultrasound as liver enzymes are   slightly elevated to rule out any occult infection. Further intervention will   be per hospital course, continue to closely monitor. 2. Lightheadedness/fall, most likely secondary to #1. CT of the head   was negative yesterday. Will provide neurovascular checks. Will get   PT consult. Will continue to closely monitor. Gentle IV hydration and   further intervention will be per hospital course. May consider further   imaging and diagnostics if symptoms do not improve and may consider   getting a Neurology consult. The patient does have history of   neuropathy. 3. Elevated troponin, minimally. No EKG changes. Will  troponin. Will   continue the patient on a and aspirin. Will monitor on a telemetry   bed and further intervention will be per hospital course. If elevation   persists, may consider getting a Cardiology consult. Will also get an   echocardiogram to rule out any acute pathology. Further intervention   will be per hospital course. 4. Diabetes.  The patient will be on sliding scale Novolog insulin, Accu-  Cheks and diet control. 5. Gastrointestinal and deep venous thrombosis prophylaxis. The   patient will be on sequential compression devices.         Odella Sicard, MD Charo[facility] Rives / [facility]   D:  07/07/2017   20:02   T:  07/07/2017   20:46   Job #:  [id number]

## 2017-07-08 NOTE — PROGRESS NOTES
1900- Bedside shift change report given to Jesus Glass rn (oncoming nurse) by Edwin Paul (offgoing nurse). Report included the following information SBAR, Kardex, Procedure Summary, Intake/Output, MAR and Cardiac Rhythm NSR.

## 2017-07-09 LAB
ANION GAP BLD CALC-SCNC: 8 MMOL/L (ref 5–15)
BASOPHILS # BLD AUTO: 0 K/UL
BASOPHILS # BLD: 0 %
BNP SERPL-MCNC: 535 PG/ML (ref 0–100)
BUN SERPL-MCNC: 17 MG/DL (ref 6–20)
BUN/CREAT SERPL: 14 (ref 12–20)
CALCIUM SERPL-MCNC: 8.3 MG/DL (ref 8.5–10.1)
CHLORIDE SERPL-SCNC: 110 MMOL/L (ref 97–108)
CHOLEST SERPL-MCNC: 118 MG/DL
CK MB CFR SERPL CALC: 1 % (ref 0–2.5)
CK MB SERPL-MCNC: 6.7 NG/ML (ref 5–25)
CK SERPL-CCNC: 646 U/L (ref 39–308)
CO2 SERPL-SCNC: 18 MMOL/L (ref 21–32)
CREAT SERPL-MCNC: 1.22 MG/DL (ref 0.7–1.3)
DIFFERENTIAL METHOD BLD: ABNORMAL
EOSINOPHIL # BLD: 0.8 K/UL
EOSINOPHIL NFR BLD: 5 %
ERYTHROCYTE [DISTWIDTH] IN BLOOD BY AUTOMATED COUNT: 13.7 % (ref 11.5–14.5)
GLUCOSE BLD STRIP.AUTO-MCNC: 137 MG/DL (ref 65–100)
GLUCOSE BLD STRIP.AUTO-MCNC: 94 MG/DL (ref 65–100)
GLUCOSE BLD STRIP.AUTO-MCNC: 94 MG/DL (ref 65–100)
GLUCOSE SERPL-MCNC: 94 MG/DL (ref 65–100)
HCT VFR BLD AUTO: 33.5 % (ref 36.6–50.3)
HDLC SERPL-MCNC: 31 MG/DL
HDLC SERPL: 3.8 {RATIO} (ref 0–5)
HGB BLD-MCNC: 11.6 G/DL (ref 12.1–17)
LDLC SERPL CALC-MCNC: 70.2 MG/DL (ref 0–100)
LIPID PROFILE,FLP: NORMAL
LYMPHOCYTES # BLD AUTO: 9 %
LYMPHOCYTES # BLD: 1.4 K/UL
MCH RBC QN AUTO: 33.5 PG (ref 26–34)
MCHC RBC AUTO-ENTMCNC: 34.6 G/DL (ref 30–36.5)
MCV RBC AUTO: 96.8 FL (ref 80–99)
MONOCYTES # BLD: 0.5 K/UL
MONOCYTES NFR BLD AUTO: 3 %
NEUTS BAND NFR BLD MANUAL: 3 %
NEUTS SEG # BLD: 13.3 K/UL
NEUTS SEG NFR BLD AUTO: 80 %
PLATELET # BLD AUTO: 118 K/UL (ref 150–400)
POTASSIUM SERPL-SCNC: 4 MMOL/L (ref 3.5–5.1)
RBC # BLD AUTO: 3.46 M/UL (ref 4.1–5.7)
RBC MORPH BLD: ABNORMAL
SERVICE CMNT-IMP: ABNORMAL
SERVICE CMNT-IMP: NORMAL
SERVICE CMNT-IMP: NORMAL
SODIUM SERPL-SCNC: 136 MMOL/L (ref 136–145)
TRIGL SERPL-MCNC: 84 MG/DL (ref ?–150)
TROPONIN I SERPL-MCNC: 1.75 NG/ML
VLDLC SERPL CALC-MCNC: 16.8 MG/DL
WBC # BLD AUTO: 16 K/UL (ref 4.1–11.1)

## 2017-07-09 PROCEDURE — 74011250636 HC RX REV CODE- 250/636: Performed by: INTERNAL MEDICINE

## 2017-07-09 PROCEDURE — 36415 COLL VENOUS BLD VENIPUNCTURE: CPT | Performed by: INTERNAL MEDICINE

## 2017-07-09 PROCEDURE — 93306 TTE W/DOPPLER COMPLETE: CPT

## 2017-07-09 PROCEDURE — 80061 LIPID PANEL: CPT | Performed by: INTERNAL MEDICINE

## 2017-07-09 PROCEDURE — 87493 C DIFF AMPLIFIED PROBE: CPT | Performed by: INTERNAL MEDICINE

## 2017-07-09 PROCEDURE — 74011000250 HC RX REV CODE- 250: Performed by: INTERNAL MEDICINE

## 2017-07-09 PROCEDURE — 80048 BASIC METABOLIC PNL TOTAL CA: CPT | Performed by: INTERNAL MEDICINE

## 2017-07-09 PROCEDURE — 94761 N-INVAS EAR/PLS OXIMETRY MLT: CPT

## 2017-07-09 PROCEDURE — 74011000258 HC RX REV CODE- 258: Performed by: INTERNAL MEDICINE

## 2017-07-09 PROCEDURE — 74011250637 HC RX REV CODE- 250/637: Performed by: FAMILY MEDICINE

## 2017-07-09 PROCEDURE — 85025 COMPLETE CBC W/AUTO DIFF WBC: CPT | Performed by: INTERNAL MEDICINE

## 2017-07-09 PROCEDURE — 89055 LEUKOCYTE ASSESSMENT FECAL: CPT | Performed by: INTERNAL MEDICINE

## 2017-07-09 PROCEDURE — 77010033678 HC OXYGEN DAILY

## 2017-07-09 PROCEDURE — 82553 CREATINE MB FRACTION: CPT | Performed by: INTERNAL MEDICINE

## 2017-07-09 PROCEDURE — 74011250637 HC RX REV CODE- 250/637: Performed by: INTERNAL MEDICINE

## 2017-07-09 PROCEDURE — 87077 CULTURE AEROBIC IDENTIFY: CPT | Performed by: INTERNAL MEDICINE

## 2017-07-09 PROCEDURE — 74011250636 HC RX REV CODE- 250/636: Performed by: FAMILY MEDICINE

## 2017-07-09 PROCEDURE — 84484 ASSAY OF TROPONIN QUANT: CPT | Performed by: INTERNAL MEDICINE

## 2017-07-09 PROCEDURE — 65660000000 HC RM CCU STEPDOWN

## 2017-07-09 PROCEDURE — 74011000250 HC RX REV CODE- 250: Performed by: FAMILY MEDICINE

## 2017-07-09 PROCEDURE — 77030029684 HC NEB SM VOL KT MONA -A

## 2017-07-09 PROCEDURE — 83880 ASSAY OF NATRIURETIC PEPTIDE: CPT | Performed by: INTERNAL MEDICINE

## 2017-07-09 PROCEDURE — 87045 FECES CULTURE AEROBIC BACT: CPT | Performed by: INTERNAL MEDICINE

## 2017-07-09 PROCEDURE — 74011000258 HC RX REV CODE- 258: Performed by: FAMILY MEDICINE

## 2017-07-09 PROCEDURE — 94640 AIRWAY INHALATION TREATMENT: CPT

## 2017-07-09 PROCEDURE — 82550 ASSAY OF CK (CPK): CPT | Performed by: INTERNAL MEDICINE

## 2017-07-09 PROCEDURE — 82962 GLUCOSE BLOOD TEST: CPT

## 2017-07-09 RX ADMIN — LEVOTHYROXINE SODIUM 25 MCG: 25 TABLET ORAL at 09:54

## 2017-07-09 RX ADMIN — ALBUTEROL SULFATE 2.5 MG: 2.5 SOLUTION RESPIRATORY (INHALATION) at 04:37

## 2017-07-09 RX ADMIN — Medication 10 ML: at 21:44

## 2017-07-09 RX ADMIN — ALBUTEROL SULFATE 2.5 MG: 2.5 SOLUTION RESPIRATORY (INHALATION) at 11:52

## 2017-07-09 RX ADMIN — FLECAINIDE ACETATE 50 MG: 100 TABLET ORAL at 09:54

## 2017-07-09 RX ADMIN — ENOXAPARIN SODIUM 90 MG: 30 INJECTION SUBCUTANEOUS at 09:00

## 2017-07-09 RX ADMIN — ALBUTEROL SULFATE 2.5 MG: 2.5 SOLUTION RESPIRATORY (INHALATION) at 07:44

## 2017-07-09 RX ADMIN — FLECAINIDE ACETATE 50 MG: 100 TABLET ORAL at 18:20

## 2017-07-09 RX ADMIN — ALBUTEROL SULFATE 2.5 MG: 2.5 SOLUTION RESPIRATORY (INHALATION) at 00:26

## 2017-07-09 RX ADMIN — METOPROLOL SUCCINATE 12.5 MG: 25 TABLET, EXTENDED RELEASE ORAL at 09:54

## 2017-07-09 RX ADMIN — ALBUTEROL SULFATE 2.5 MG: 2.5 SOLUTION RESPIRATORY (INHALATION) at 23:34

## 2017-07-09 RX ADMIN — ALBUTEROL SULFATE 2.5 MG: 2.5 SOLUTION RESPIRATORY (INHALATION) at 19:28

## 2017-07-09 RX ADMIN — ASPIRIN 81 MG CHEWABLE TABLET 81 MG: 81 TABLET CHEWABLE at 09:54

## 2017-07-09 RX ADMIN — DOXYCYCLINE 100 MG: 100 INJECTION, POWDER, LYOPHILIZED, FOR SOLUTION INTRAVENOUS at 13:14

## 2017-07-09 RX ADMIN — DOXYCYCLINE 100 MG: 100 INJECTION, POWDER, LYOPHILIZED, FOR SOLUTION INTRAVENOUS at 00:22

## 2017-07-09 RX ADMIN — Medication 10 ML: at 18:08

## 2017-07-09 RX ADMIN — Medication 10 ML: at 05:29

## 2017-07-09 RX ADMIN — ENOXAPARIN SODIUM 90 MG: 30 INJECTION SUBCUTANEOUS at 21:42

## 2017-07-09 RX ADMIN — ALBUTEROL SULFATE 2.5 MG: 2.5 SOLUTION RESPIRATORY (INHALATION) at 15:04

## 2017-07-09 RX ADMIN — CEFTRIAXONE 1 G: 1 INJECTION, POWDER, FOR SOLUTION INTRAMUSCULAR; INTRAVENOUS at 23:32

## 2017-07-09 NOTE — PROGRESS NOTES
Progress Note      7/9/2017 11:38 AM  NAME: Viktoria Cheung   MRN:  [id number]   Admit Diagnosis: Pneumonia                          Assessment:                 1. Dyspnea, syncope, febrile, ?pneumonia, leukoctyosis, increased troponin suggestive of NSTEMI  2. Stress in 2010 negative  3. Echo 4/2017 EF 60%  4. Prox Afib/aflutter with RBBB/lAHB on flecainide, dig stopped in past due to bradycardia  5. DM  6. HTN  7. Dyslipidemia  8. CKD 1.3 Dr. Bethanie Ann  9. DANIELA in 2015 ok  10. Hypothyroid  11. Prostate CA  12. , very mild functional capacity  13. Cardiologist:  Dr. Sandra Dumont                            Plan:                  Unusual presentation. Dyspnea, mild cough, not productive, CT suggest pneumonia, febrile, leukocytosis, on Abx. Breathing improving. No chest pain, no acute ECG changes, but increased troponin. No edema, does not appear to be in CHF. Sinus so far, hx of bradycardia     No PE or clear source of infection on CT. Check echo. Possible cath as improves.     1. Changed eliquis to lovenox while in hospital.  2. Cont Added ASA  3. Cont Decreased flecainide to 50mg bid  4. Cont Added low dose metoprolol  5. Hold captropril for now  6. Stop fluids  7. Resume pravastatin once ck normalizes  .       [x]        High complexity decision making was performed         Subjective:     Viktoria Cheung denies chest pain, dyspnea improving. Discussed with RN events overnight. Review of Systems:    Symptom Y/N Comments  Symptom Y/N Comments   Fever/Chills N   Chest Pain N    Poor Appetite N   Edema N    Cough N   Abdominal Pain N    Sputum N   Joint Pain N    SOB/STILL N   Pruritis/Rash N    Nausea/vomit N   Tolerating PT/OT Y    Diarrhea N   Tolerating Diet Y    Constipation N   Other       Could NOT obtain due to:      Objective:      Physical Exam:    Last 24hrs VS reviewed since prior progress note.  Most recent are:    Visit Vitals    /54 (BP 1 Location: Right arm, BP Patient Position: At rest)    Pulse 90    Temp 100.1 °F (37.8 °C)    Resp 19    Ht 6' 2\" (1.88 m)    Wt 96.4 kg (212 lb 8.4 oz)    SpO2 92%    BMI 27.29 kg/m2       Intake/Output Summary (Last 24 hours) at 07/09/17 1138  Last data filed at 07/09/17 0320   Gross per 24 hour   Intake          1723.75 ml   Output                0 ml   Net          1723.75 ml        General Appearance: Well developed, well nourished, alert & oriented x 3,    no acute distress. Ears/Nose/Mouth/Throat: Hearing grossly normal.  Neck: Supple. Chest: Lungs clear to auscultation bilaterally. Cardiovascular: Regular rate and rhythm, S1S2 normal, no murmur. Abdomen: Soft, non-tender, bowel sounds are active. Extremities: No edema bilaterally. Skin: Warm and dry. PMH/SH reviewed - no change compared to H&P    Data Review    Telemetry: normal sinus rhythm     Lab Data Personally Reviewed:    Recent Labs      07/09/17 0350 07/08/17 0424   WBC  16.0*  19.8*   HGB  11.6*  12.3   HCT  33.5*  36.0*   PLT  118*  131*     No results for input(s): INR, PTP, APTT in the last 72 hours. No lab exists for component: INREXT   Recent Labs      07/09/17 0350 07/08/17 0424 07/07/17   1624   NA  136  137  131*   K  4.0  3.6  3.9   CL  110*  108  100   CO2  18*  19*  22   BUN  17  19  18   CREA  1.22  1.32*  1.39*   GLU  94  151*  153*   CA  8.3*  8.5  8.4*   MG   --    --   1.9     Recent Labs      07/09/17   0350 07/08/17   0915  07/08/17   0424 07/07/17   1624   CPK   --   623*  505*   --   361*   CKNDX  1.0   --    --    --   0.6   TROIQ  1.75*  3.03*  5.59*   < >  0.08*    < > = values in this interval not displayed.      Lab Results   Component Value Date/Time    Cholesterol, total 118 07/09/2017 05:41 AM    HDL Cholesterol 31 07/09/2017 05:41 AM    LDL, calculated 70.2 07/09/2017 05:41 AM    Triglyceride 84 07/09/2017 05:41 AM    CHOL/HDL Ratio 3.8 07/09/2017 05:41 AM       Recent Labs      07/08/17   0424  07/07/17   1624 07/06/17   1753   SGOT  39*  42*  45*   AP  142*  175*  169*   TP  5.7*  6.6  6.5   ALB  2.2*  2.7*  2.8*   GLOB  3.5  3.9  3.7     No results for input(s): PH, PCO2, PO2 in the last 72 hours.     Medications Personally Reviewed:    Current Facility-Administered Medications   Medication Dose Route Frequency    ondansetron (ZOFRAN) injection 4 mg  4 mg IntraVENous Q6H PRN    flecainide (TAMBOCOR) tablet 50 mg  50 mg Oral BID    metoprolol succinate (TOPROL-XL) XL tablet 12.5 mg  12.5 mg Oral DAILY    doxycycline (VIBRAMYCIN) 100 mg in 0.9% sodium chloride (MBP/ADV) 100 mL  100 mg IntraVENous Q12H    enoxaparin (LOVENOX) 90 mg  90 mg SubCUTAneous Q12H    sodium chloride (NS) flush 5-10 mL  5-10 mL IntraVENous PRN    levothyroxine (SYNTHROID) tablet 25 mcg  25 mcg Oral ACB    sodium chloride (NS) flush 5-10 mL  5-10 mL IntraVENous Q8H    sodium chloride (NS) flush 5-10 mL  5-10 mL IntraVENous PRN    0.9% sodium chloride infusion  75 mL/hr IntraVENous CONTINUOUS    cefTRIAXone (ROCEPHIN) 1 g in 0.9% sodium chloride (MBP/ADV) 50 mL  1 g IntraVENous Q24H    acetaminophen (TYLENOL) tablet 650 mg  650 mg Oral Q4H PRN    albuterol (PROVENTIL VENTOLIN) nebulizer solution 2.5 mg  2.5 mg Nebulization Q4H RT    aspirin chewable tablet 81 mg  81 mg Oral DAILY    glucose chewable tablet 16 g  4 Tab Oral PRN    dextrose (D50W) injection syrg 12.5-25 g  12.5-25 g IntraVENous PRN    glucagon (GLUCAGEN) injection 1 mg  1 mg IntraMUSCular PRN    insulin lispro (HUMALOG) injection   SubCUTAneous AC&HS         Shu Seaman MD

## 2017-07-09 NOTE — PROGRESS NOTES
Nursing Communication Tool      7:32 PM  7/9/2017     Bedside and Verbal shift change report given to Joe Su RN (incoming nurse) by Feroz Martinez RN (outgoing nurse) on Roderick Bustillos a [de-identified] y.o. male who was admitted on 7/7/2017  4:03 PM. Report included the following information SBAR, Kardex, Intake/Output, MAR, Accordion and Recent Results. Significant changes during shift: Incontinent of bowels, oxygen weaned to 2L sats 95%, good PO appetite, ambulates using walker with standby assist      Issues for physician to address: Cardiac work-up, PT/OT consult if to remain in hospital            Code Status: Full Code     Infections: No current active infections     Allergies: Lovastatin     Current diet: DIET CARDIAC Regular  DIET ONE TIME MESSAGE       Pain Meds [] yes [x] no   Bowel Movement [x] yes [] no   Last Bowel Movement (date)    7/9/17            Vital Signs:   Patient Vitals for the past 12 hrs:   Temp Pulse Resp BP SpO2   07/09/17 1927 - - - - 92 %   07/09/17 1525 97.5 °F (36.4 °C) 83 22 117/68 98 %   07/09/17 1503 - - - - 94 %   07/09/17 1204 98.2 °F (36.8 °C) 83 21 121/71 97 %   07/09/17 1152 - - - - 97 %   07/09/17 0743 - - - - 92 %      Intake & Output:     Intake/Output Summary (Last 24 hours) at 07/09/17 1932  Last data filed at 07/09/17 0320   Gross per 24 hour   Intake          1723.75 ml   Output                0 ml   Net          1723.75 ml      Laboratory Results:     Recent Results (from the past 12 hour(s))   GLUCOSE, POC    Collection Time: 07/09/17 12:08 PM   Result Value Ref Range    Glucose (POC) 94 65 - 100 mg/dL    Performed by JUAN Kim    Collection Time: 07/09/17  6:15 PM   Result Value Ref Range    Glucose (POC) 94 65 - 100 mg/dL    Performed by Aniya Restrepo               Opportunity for questions and clarifications were given to the incoming nurse.  Patient's bed is in low position, side rails x2, door open PRN, call bell within reach and patient not in distress.       Montez Fernando RN

## 2017-07-09 NOTE — PROGRESS NOTES
Bedside and Verbal shift change report given to Kathy [address] (oncoming nurse) by Erna Pollard RN (offgoing nurse). Report included the following information SBAR, Kardex, Intake/Output, MAR, Recent Results and Med Rec Status.

## 2017-07-10 LAB
ANION GAP BLD CALC-SCNC: 8 MMOL/L (ref 5–15)
BASOPHILS # BLD AUTO: 0.1 K/UL (ref 0–0.1)
BASOPHILS # BLD: 1 % (ref 0–1)
BUN SERPL-MCNC: 16 MG/DL (ref 6–20)
BUN/CREAT SERPL: 14 (ref 12–20)
C DIFF TOX GENS STL QL NAA+PROBE: NEGATIVE
CALCIUM SERPL-MCNC: 8.2 MG/DL (ref 8.5–10.1)
CHLORIDE SERPL-SCNC: 110 MMOL/L (ref 97–108)
CO2 SERPL-SCNC: 22 MMOL/L (ref 21–32)
CREAT SERPL-MCNC: 1.11 MG/DL (ref 0.7–1.3)
DIFFERENTIAL METHOD BLD: ABNORMAL
EOSINOPHIL # BLD: 1 K/UL (ref 0–0.4)
EOSINOPHIL NFR BLD: 8 % (ref 0–7)
ERYTHROCYTE [DISTWIDTH] IN BLOOD BY AUTOMATED COUNT: 13.7 % (ref 11.5–14.5)
GLUCOSE BLD STRIP.AUTO-MCNC: 112 MG/DL (ref 65–100)
GLUCOSE BLD STRIP.AUTO-MCNC: 114 MG/DL (ref 65–100)
GLUCOSE BLD STRIP.AUTO-MCNC: 124 MG/DL (ref 65–100)
GLUCOSE BLD STRIP.AUTO-MCNC: 128 MG/DL (ref 65–100)
GLUCOSE SERPL-MCNC: 94 MG/DL (ref 65–100)
HCT VFR BLD AUTO: 33.2 % (ref 36.6–50.3)
HGB BLD-MCNC: 11.4 G/DL (ref 12.1–17)
LYMPHOCYTES # BLD AUTO: 23 % (ref 12–49)
LYMPHOCYTES # BLD: 2.7 K/UL (ref 0.8–3.5)
MCH RBC QN AUTO: 33.4 PG (ref 26–34)
MCHC RBC AUTO-ENTMCNC: 34.3 G/DL (ref 30–36.5)
MCV RBC AUTO: 97.4 FL (ref 80–99)
MONOCYTES # BLD: 0.8 K/UL (ref 0–1)
MONOCYTES NFR BLD AUTO: 7 % (ref 5–13)
NEUTS SEG # BLD: 7.3 K/UL (ref 1.8–8)
NEUTS SEG NFR BLD AUTO: 61 % (ref 32–75)
PLATELET # BLD AUTO: 115 K/UL (ref 150–400)
POTASSIUM SERPL-SCNC: 3.7 MMOL/L (ref 3.5–5.1)
RBC # BLD AUTO: 3.41 M/UL (ref 4.1–5.7)
RBC MORPH BLD: ABNORMAL
SERVICE CMNT-IMP: ABNORMAL
SODIUM SERPL-SCNC: 140 MMOL/L (ref 136–145)
WBC # BLD AUTO: 11.9 K/UL (ref 4.1–11.1)
WBC #/AREA STL HPF: NORMAL /HPF (ref 0–4)
WBC MORPH BLD: ABNORMAL

## 2017-07-10 PROCEDURE — 80048 BASIC METABOLIC PNL TOTAL CA: CPT | Performed by: INTERNAL MEDICINE

## 2017-07-10 PROCEDURE — 97116 GAIT TRAINING THERAPY: CPT

## 2017-07-10 PROCEDURE — 85025 COMPLETE CBC W/AUTO DIFF WBC: CPT | Performed by: INTERNAL MEDICINE

## 2017-07-10 PROCEDURE — 74011250637 HC RX REV CODE- 250/637: Performed by: FAMILY MEDICINE

## 2017-07-10 PROCEDURE — 36415 COLL VENOUS BLD VENIPUNCTURE: CPT | Performed by: INTERNAL MEDICINE

## 2017-07-10 PROCEDURE — 74011000258 HC RX REV CODE- 258: Performed by: INTERNAL MEDICINE

## 2017-07-10 PROCEDURE — 74011000250 HC RX REV CODE- 250: Performed by: FAMILY MEDICINE

## 2017-07-10 PROCEDURE — 74011250637 HC RX REV CODE- 250/637: Performed by: INTERNAL MEDICINE

## 2017-07-10 PROCEDURE — 82962 GLUCOSE BLOOD TEST: CPT

## 2017-07-10 PROCEDURE — 74011000250 HC RX REV CODE- 250: Performed by: INTERNAL MEDICINE

## 2017-07-10 PROCEDURE — 74011250636 HC RX REV CODE- 250/636: Performed by: FAMILY MEDICINE

## 2017-07-10 PROCEDURE — 74011000258 HC RX REV CODE- 258: Performed by: FAMILY MEDICINE

## 2017-07-10 PROCEDURE — 94640 AIRWAY INHALATION TREATMENT: CPT

## 2017-07-10 PROCEDURE — 74011250636 HC RX REV CODE- 250/636: Performed by: INTERNAL MEDICINE

## 2017-07-10 PROCEDURE — 77010033678 HC OXYGEN DAILY

## 2017-07-10 PROCEDURE — 65660000000 HC RM CCU STEPDOWN

## 2017-07-10 RX ORDER — METOPROLOL SUCCINATE 25 MG/1
25 TABLET, EXTENDED RELEASE ORAL DAILY
Status: DISCONTINUED | OUTPATIENT
Start: 2017-07-11 | End: 2017-07-14 | Stop reason: HOSPADM

## 2017-07-10 RX ORDER — LOPERAMIDE HYDROCHLORIDE 2 MG/1
2 CAPSULE ORAL
Status: DISCONTINUED | OUTPATIENT
Start: 2017-07-10 | End: 2017-07-14 | Stop reason: HOSPADM

## 2017-07-10 RX ORDER — ALBUTEROL SULFATE 0.83 MG/ML
2.5 SOLUTION RESPIRATORY (INHALATION)
Status: DISCONTINUED | OUTPATIENT
Start: 2017-07-10 | End: 2017-07-13

## 2017-07-10 RX ORDER — ALBUTEROL SULFATE 0.83 MG/ML
2.5 SOLUTION RESPIRATORY (INHALATION)
Status: DISCONTINUED | OUTPATIENT
Start: 2017-07-10 | End: 2017-07-14 | Stop reason: HOSPADM

## 2017-07-10 RX ORDER — SODIUM CHLORIDE 9 MG/ML
25 INJECTION, SOLUTION INTRAVENOUS CONTINUOUS
Status: DISPENSED | OUTPATIENT
Start: 2017-07-11 | End: 2017-07-11

## 2017-07-10 RX ADMIN — ALBUTEROL SULFATE 2.5 MG: 2.5 SOLUTION RESPIRATORY (INHALATION) at 15:39

## 2017-07-10 RX ADMIN — ALBUTEROL SULFATE 2.5 MG: 2.5 SOLUTION RESPIRATORY (INHALATION) at 21:28

## 2017-07-10 RX ADMIN — DOXYCYCLINE 100 MG: 100 INJECTION, POWDER, LYOPHILIZED, FOR SOLUTION INTRAVENOUS at 13:11

## 2017-07-10 RX ADMIN — ALBUTEROL SULFATE 2.5 MG: 2.5 SOLUTION RESPIRATORY (INHALATION) at 09:27

## 2017-07-10 RX ADMIN — CEFTRIAXONE 1 G: 1 INJECTION, POWDER, FOR SOLUTION INTRAMUSCULAR; INTRAVENOUS at 23:19

## 2017-07-10 RX ADMIN — DOXYCYCLINE 100 MG: 100 INJECTION, POWDER, LYOPHILIZED, FOR SOLUTION INTRAVENOUS at 00:36

## 2017-07-10 RX ADMIN — FLECAINIDE ACETATE 50 MG: 100 TABLET ORAL at 17:23

## 2017-07-10 RX ADMIN — Medication 10 ML: at 23:19

## 2017-07-10 RX ADMIN — LEVOTHYROXINE SODIUM 25 MCG: 25 TABLET ORAL at 08:35

## 2017-07-10 RX ADMIN — ALBUTEROL SULFATE 2.5 MG: 2.5 SOLUTION RESPIRATORY (INHALATION) at 04:11

## 2017-07-10 RX ADMIN — ASPIRIN 81 MG CHEWABLE TABLET 81 MG: 81 TABLET CHEWABLE at 08:34

## 2017-07-10 RX ADMIN — Medication 10 ML: at 13:11

## 2017-07-10 RX ADMIN — Medication 10 ML: at 05:51

## 2017-07-10 RX ADMIN — FLECAINIDE ACETATE 50 MG: 100 TABLET ORAL at 08:35

## 2017-07-10 RX ADMIN — METOPROLOL SUCCINATE 12.5 MG: 25 TABLET, EXTENDED RELEASE ORAL at 08:41

## 2017-07-10 RX ADMIN — ENOXAPARIN SODIUM 90 MG: 30 INJECTION SUBCUTANEOUS at 09:00

## 2017-07-10 NOTE — PROGRESS NOTES
Hospitalist Progress Note    NAME: Anum Knox   :  1935   MRN:  [id number]     Admit date: 2017    Today's date: 07/10/17    PCP: Siva Bennett. Jenny Ponce M.D. Cell [phone]      Assessment / Plan:    Severe sepsis POA WBC 22,200, HR 93, temp 102.7, lactate 2.3 . Possible bilateral lower lobe pneumonia POA  Diarrhea POA  Presents with nonspecific weakness, increasing SOB, mild cough  CT chest with subtle ASD at the bases, reviewed scan today  BC remain negative  UA negative for UA  CT scan abdomen/pelvis negative for colitis or intraabdominal source  stool studies pending       0 to 4 WBC in stool  IV ceftriaxone and doxycycline(stop azithromycin on the flecainide) day 4      Change to cefuroxime/doxycycline to complete 7-10 days total at discharge  Diarrhea worsening on IV antibiotics       If C difficile negative, will start on imodium    Non ST elevation MI POA peak troponin 3.55  Chronic systolic CHF LVEF 35 to 93%, ? Takusubo's  Essential HTN POA  Chronic atrial fibrillation POA  ASA, lovenox(eliquis at home)  Lopressor  Continue flecainide  Hold cardura, captopril with borderline BP  D/W Dr Alberto Sepulveda, possible cath in AM  Echo LVEF 35 to 40% with wall motion morphology \"suggestive of Takusubo's CM\"       Normal RV function, no AS or MR  No PE on CTA    Lightheadedness/fall POA  Cloud CT scan negative   PT/OT consults    Diabetes type 2 POA HgBa1c 5.6  SSI  HgBa1c 94, 137, 124, 112, 128    Code status: Full  Prophylaxis: Lovenox  Recommended Disposition: Home w/Family     Subjective:     Chief Complaint / Reason for Physician Visit f/u severe sepsis  \"Thje diarrhea is really bad\"  No cough and SOB, no CP  Major complaint is an increase in the diarrhea he was having  Rectal area is \"almost bleeding\" it is so irritated  No SSCP or abdominal pain  No HA or N/V  Discussed with RN events overnight.      Review of Systems:  Symptom Y/N Comments  Symptom Y/N Comments   Fever/Chills y   Chest Pain n    Poor Appetite    Edema     Cough N   Abdominal Pain n    Sputum    Joint Pain     SOB/STILL n   Pruritis/Rash     Nausea/vomit n   Tolerating PT/OT     Diarrhea Y   Tolerating Diet Yes    Constipation    Other       Could NOT obtain due to:      Objective:     VITALS:   Last 24hrs VS reviewed since prior progress note. Most recent are:  Patient Vitals for the past 24 hrs:   Temp Pulse Resp BP SpO2   07/10/17 1142 97.3 °F (36.3 °C) 90 18 136/83 96 %   07/10/17 0927 - - - - 96 %   07/10/17 0757 98.7 °F (37.1 °C) 85 19 144/80 93 %   07/10/17 0410 - - - - 93 %   07/10/17 0324 98.2 °F (36.8 °C) 85 18 123/70 95 %   07/09/17 2333 - - - - 97 %   07/09/17 2317 99.6 °F (37.6 °C) 83 19 125/77 92 %   07/09/17 2029 98.9 °F (37.2 °C) 87 20 122/62 93 %   07/09/17 1927 - - - - 92 %   07/09/17 1525 97.5 °F (36.4 °C) 83 22 117/68 98 %   07/09/17 1503 - - - - 94 %       Intake/Output Summary (Last 24 hours) at 07/10/17 1256  Last data filed at 07/10/17 0036   Gross per 24 hour   Intake              550 ml   Output                0 ml   Net              550 ml        Wt Readings from Last 12 Encounters:   07/10/17 94.2 kg (207 lb 10.8 oz)   07/06/17 92.1 kg (203 lb)   10/12/16 86.8 kg (191 lb 5.8 oz)   05/11/15 90.3 kg (199 lb)   09/18/14 91.6 kg (202 lb)   04/28/14 91.6 kg (202 lb)   11/25/13 90.7 kg (200 lb)   10/29/13 91.6 kg (202 lb)   10/21/13 92.1 kg (203 lb)   04/15/13 91.6 kg (202 lb)   10/15/12 88.9 kg (196 lb)   04/11/12 87.5 kg (193 lb)       PHYSICAL EXAM:  General: WD, WN. Alert, cooperative, no acute distress    EENT:  PERRL. Anicteric sclerae. MMM  Resp:  Crackles at bases bilaterally, no wheezing.   No accessory muscle use  CV:  Regular  rhythm,  No edema  GI:  Soft, mildly distended, Non tender.  +Bowel sounds, no rebound  Neurologic:  Alert and oriented X 3, normal speech, non focal motor exam  Psych:   Good insight. Not anxious nor agitated  MS:  No joint swelling or erythema  Skin:  No rashes. No jaundice    Reviewed most current lab test results and cultures  YES  Reviewed most current radiology test results   YES  Review and summation of old records today    NO  Reviewed patient's current orders and MAR    YES  PMH/SH reviewed - no change compared to H&P  ________________________________________________________________________  Care Plan discussed with:    Comments   Patient x    Family  x Ex-wife   RN x    Care Manager     Consultant  x Dr Jean Marie Sanz team rounds were held today with , nursing, pharmacist and clinical coordinator. Patient's plan of care was discussed; medications were reviewed and discharge planning was addressed. ________________________________________________________________________  Total NON critical care TIME: 35   Minutes    Total CRITICAL CARE TIME Spent:   Minutes non procedure based      Comments   >50% of visit spent in counseling and coordination of care     ________________________________________________________________________  Kelly Soria MD     Procedures: see electronic medical records for all procedures/Xrays and details which were not copied into this note but were reviewed prior to creation of Plan. LABS:  I reviewed today's most current labs and imaging studies.   Pertinent labs include:  Recent Labs      07/10/17   0325  07/09/17   0350  07/08/17   0424   WBC  11.9*  16.0*  19.8*   HGB  11.4*  11.6*  12.3   HCT  33.2*  33.5*  36.0*   PLT  115*  118*  131*     Recent Labs      07/10/17   0325  07/09/17   0350  07/08/17   0424  07/07/17   1624   NA  140  136  137  131*   K  3.7  4.0  3.6  3.9   CL  110*  110*  108  100   CO2  22  18*  19*  22   GLU  94  94  151*  153*   BUN  16  17  19  18   CREA  1.11  1.22  1.32*  1.39*   CA  8.2*  8.3*  8.5  8.4*   MG   --    --    --   1.9   ALB   --    --   2.2*  2.7*   TBILI   --    --   0.5  0.9   SGOT   --    --   39*  42*   ALT   -- --   68  76

## 2017-07-10 NOTE — PROGRESS NOTES
Bedside shift change report given to Rossy Gilmore (oncoming nurse) by Katy Oconnor (offgoing nurse). Report included the following information SBAR, Kardex, Intake/Output, MAR and Recent Results. Zone Phone for oncoming shift:   [phone]    Shift Summary: No complaints of pain. Cardiac cath scheduled for tomorrow. LDAs               Peripheral IV 07/09/17 Right Forearm (Active)   Site Assessment Clean, dry, & intact 7/10/2017 11:22 AM   Phlebitis Assessment 0 7/10/2017 11:22 AM   Infiltration Assessment 0 7/10/2017 11:22 AM   Dressing Status Clean, dry, & intact 7/10/2017 11:22 AM   Dressing Type Transparent;Tape 7/10/2017 11:22 AM   Hub Color/Line Status Blue;Capped 7/10/2017 11:22 AM   Alcohol Cap Used Yes 7/10/2017  7:31 AM                        Intake & Output   Date 07/09/17 0700 - 07/10/17 0659 07/10/17 0700 - 07/11/17 0659   Shift 0700-1859 1900-0659 24 Hour Total 0700-1859 0689-8041 24 Hour Total   I  N  T  A  K  E   P.O.    240  240      P. O.    240  240    I.V.  (mL/kg/hr)  550  (0.5) 550  (0.2) 100  100      Volume (cefTRIAXone (ROCEPHIN) 1 g in 0.9% sodium chloride (MBP/ADV) 50 mL)  150 150         Volume (doxycycline (VIBRAMYCIN) 100 mg in 0.9% sodium chloride (MBP/ADV) 100 mL)  400 400 100  100    Shift Total  (mL/kg)  550  (5.8) 550  (5.8) 340  (3.6)  340  (3.6)   O  U  T  P  U  T   Stool            Stool Occurrence(s)    1 x  1 x    Shift Total  (mL/kg)         NET  550 550 340  340   Weight (kg) 96.4 94.2 94.2 94.2 94.2 94.2      Last Bowel Movement Last Bowel Movement Date: 07/10/17   Glucose Checks [] N/A  [] AC/HS  [] Q6  Concerns:   Nutrition Active Orders   Diet    DIET CLEAR LIQUID    DIET NPO       Consults []PT  []OT  []Speech  []Case Management   Cardiac Monitoring []N/A []Yes Expires:

## 2017-07-10 NOTE — PROGRESS NOTES
Progress Note      7/10/2017 11:38 AM  NAME: Leitha Gosselin   MRN:  [id number]   Admit Diagnosis: Pneumonia                          Assessment:                 1. Dyspnea, syncope, febrile, ?pneumonia, leukoctyosis, increased troponin suggestive of NSTEMI  2. Stress in 2010 negative  3. Echo 4/2017 EF 60% Echo 7/2017 EF 35%  4. Prox Afib/aflutter with RBBB/lAHB on flecainide, dig stopped in past due to bradycardia  5. DM  6. HTN  7. Dyslipidemia  8. CKD 1.3 Dr. Kia Melendez  9. DANIELA in 2015 ok  10. Hypothyroid  11. Prostate CA  12. , very mild functional capacity  13. Cardiologist:  Dr. Ren Allred                            Plan:                  Unusual presentation. Dyspnea, mild cough, not productive, CT suggest pneumonia, febrile, leukocytosis, on Abx. Breathing improving. No chest pain, no acute ECG changes, but increased troponin. No edema, does not appear to be in CHF. Sinus so far, hx of bradycardia     No PE or clear source of infection on CT. Echo raises possiblity of Lisseth's  Plan cath Tuesday afternoon Radial approach.     1. Hold eliquis  2. Cont Added ASA  3. Cont Decreased flecainide to 50mg bid  4. Cont Added low dose metoprolol increase to 25mg  5. Hold captropril for now  6. Hydration to start tomorrow am.  7. Will decide on statin based on cath, intolerant of lovastatin  8. C.diff negative  .       [x]        High complexity decision making was performed         Subjective:     Leitha Gosselin denies chest pain, dyspnea improving. Discussed with RN events overnight.      Review of Systems:    Symptom Y/N Comments  Symptom Y/N Comments   Fever/Chills N   Chest Pain N    Poor Appetite N   Edema N    Cough N   Abdominal Pain N    Sputum N   Joint Pain N    SOB/STILL N   Pruritis/Rash N    Nausea/vomit N   Tolerating PT/OT Y    Diarrhea N   Tolerating Diet Y    Constipation N   Other       Could NOT obtain due to:      Objective:      Physical Exam:    Last 24hrs VS reviewed since prior progress note. Most recent are:    Visit Vitals    /83 (BP 1 Location: Left arm, BP Patient Position: At rest)    Pulse 90    Temp 97.3 °F (36.3 °C)    Resp 18    Ht 6' 2\" (1.88 m)    Wt 94.2 kg (207 lb 10.8 oz)    SpO2 96%    BMI 26.66 kg/m2       Intake/Output Summary (Last 24 hours) at 07/10/17 1326  Last data filed at 07/10/17 1311   Gross per 24 hour   Intake              650 ml   Output                0 ml   Net              650 ml        General Appearance: Well developed, well nourished, alert & oriented x 3,    no acute distress. Ears/Nose/Mouth/Throat: Hearing grossly normal.  Neck: Supple. Chest: Lungs clear to auscultation bilaterally. Cardiovascular: Regular rate and rhythm, S1S2 normal, no murmur. Abdomen: Soft, non-tender, bowel sounds are active. Extremities: No edema bilaterally. Skin: Warm and dry. PMH/SH reviewed - no change compared to H&P    Data Review    Telemetry: normal sinus rhythm     Lab Data Personally Reviewed:    Recent Labs      07/10/17   0325  07/09/17   0350   WBC  11.9*  16.0*   HGB  11.4*  11.6*   HCT  33.2*  33.5*   PLT  115*  118*     No results for input(s): INR, PTP, APTT in the last 72 hours. No lab exists for component: Damion Sandhu   Recent Labs      07/10/17   0325 07/09/17 0350 07/08/17 0424  07/07/17   1624   NA  140  136  137  131*   K  3.7  4.0  3.6  3.9   CL  110*  110*  108  100   CO2  22  18*  19*  22   BUN  16  17  19  18   CREA  1.11  1.22  1.32*  1.39*   GLU  94  94  151*  153*   CA  8.2*  8.3*  8.5  8.4*   MG   --    --    --   1.9     Recent Labs      07/09/17   0350  07/08/17   0915  07/08/17   0424   07/07/17   1624   CPK   --   623*  505*   --   361*   CKNDX  1.0   --    --    --   0.6   TROIQ  1.75*  3.03*  5.59*   < >  0.08*    < > = values in this interval not displayed.      Lab Results   Component Value Date/Time    Cholesterol, total 118 07/09/2017 05:41 AM    HDL Cholesterol 31 07/09/2017 05:41 AM    LDL, calculated 70.2 07/09/2017 05:41 AM    Triglyceride 84 07/09/2017 05:41 AM    CHOL/HDL Ratio 3.8 07/09/2017 05:41 AM       Recent Labs      07/08/17   0424  07/07/17   1624   SGOT  39*  42*   AP  142*  175*   TP  5.7*  6.6   ALB  2.2*  2.7*   GLOB  3.5  3.9     No results for input(s): PH, PCO2, PO2 in the last 72 hours.     Medications Personally Reviewed:    Current Facility-Administered Medications   Medication Dose Route Frequency    albuterol (PROVENTIL VENTOLIN) nebulizer solution 2.5 mg  2.5 mg Nebulization Q6HWA RT    albuterol (PROVENTIL VENTOLIN) nebulizer solution 2.5 mg  2.5 mg Nebulization Q4H PRN    [START ON 7/11/2017] metoprolol succinate (TOPROL-XL) XL tablet 25 mg  25 mg Oral DAILY    ondansetron (ZOFRAN) injection 4 mg  4 mg IntraVENous Q6H PRN    flecainide (TAMBOCOR) tablet 50 mg  50 mg Oral BID    doxycycline (VIBRAMYCIN) 100 mg in 0.9% sodium chloride (MBP/ADV) 100 mL  100 mg IntraVENous Q12H    sodium chloride (NS) flush 5-10 mL  5-10 mL IntraVENous PRN    levothyroxine (SYNTHROID) tablet 25 mcg  25 mcg Oral ACB    sodium chloride (NS) flush 5-10 mL  5-10 mL IntraVENous Q8H    sodium chloride (NS) flush 5-10 mL  5-10 mL IntraVENous PRN    cefTRIAXone (ROCEPHIN) 1 g in 0.9% sodium chloride (MBP/ADV) 50 mL  1 g IntraVENous Q24H    acetaminophen (TYLENOL) tablet 650 mg  650 mg Oral Q4H PRN    aspirin chewable tablet 81 mg  81 mg Oral DAILY    glucose chewable tablet 16 g  4 Tab Oral PRN    dextrose (D50W) injection syrg 12.5-25 g  12.5-25 g IntraVENous PRN    glucagon (GLUCAGEN) injection 1 mg  1 mg IntraMUSCular PRN    insulin lispro (HUMALOG) injection   SubCUTAneous AC&HS         Juan C Pop MD

## 2017-07-10 NOTE — PROGRESS NOTES
Bedside and Verbal shift change report given to Kathy [address] (oncoming nurse) by Jordyn Peña RN (offgoing nurse). Report included the following information SBAR, Kardex, Intake/Output, MAR and Cardiac Rhythm Normal Sinus.     Zone Phone for oncoming shift:       Shift Summary: Stool samples sent, patient now on 1.5L O2    LDAs               Peripheral IV 07/09/17 Right Forearm (Active)   Site Assessment Clean, dry, & intact 7/10/2017  3:24 AM   Phlebitis Assessment 0 7/10/2017  3:24 AM   Infiltration Assessment 0 7/10/2017  3:24 AM   Dressing Status Clean, dry, & intact 7/10/2017  3:24 AM   Dressing Type Transparent;Tape 7/10/2017  3:24 AM   Hub Color/Line Status Blue;Capped;Flushed 7/10/2017  3:24 AM   Alcohol Cap Used Yes 7/10/2017  3:24 AM                        Intake & Output   Date 07/09/17 0700 - 07/10/17 0659 07/10/17 0700 - 07/11/17 0659   Shift 4252-7127 5815-6364 24 Hour Total 4832-5446 7341-3322 24 Hour Total   I  N  T  A  K  E   I.V.  (mL/kg/hr)  550 550         Volume (cefTRIAXone (ROCEPHIN) 1 g in 0.9% sodium chloride (MBP/ADV) 50 mL)  150 150         Volume (doxycycline (VIBRAMYCIN) 100 mg in 0.9% sodium chloride (MBP/ADV) 100 mL)  400 400       Shift Total  (mL/kg)  550  (5.8) 550  (5.8)      O  U  T  P  U  T   Shift Total  (mL/kg)         NET  550 550      Weight (kg) 96.4 94.2 94.2 94.2 94.2 94.2      Last Bowel Movement Last Bowel Movement Date: 07/09/17   Glucose Checks [] N/A  [] AC/HS  [x] Q6  Concerns:   Nutrition Active Orders   Diet    DIET CARDIAC Regular       Consults [x]PT  []OT  []Speech  [x]Case Management   Cardiac Monitoring []N/A [x]Yes Expires:

## 2017-07-10 NOTE — PROGRESS NOTES
Hospitalist Progress Note    NAME: Alberto Crespo   :  1935   MRN:  [id number]     Admit date: 2017    Today's date: 17    PCP: Payal Acosta. Laurel Sotomayor M.D. Cell [phone]      Assessment / Plan:    Severe sepsis POA WBC 22,200, HR 93, temp 102.7, lactate 2.3 . Possible bilateral lower lobe pneumonia POA  Diarrhea POA  Presents with nonspecific weakness, increasing SOB, mild cough  CT chest with subtle ASD at the bases, reviewed scan today  BC remain negative  UA negative for UA  Diarrhea/loosestools, CT scan abdomen/pelvis negative  stool studies pending  IVF  IV ceftriaxone and doxycycline(stop azithromycin on the flecainide) day 3  Serial labs    Non ST elevation MI POA peak troponin 5.59  Essential HTN POA  Chronic atrial fibrillation POA  ASA, lovenox  Lopressor  Hold cardura, captopril with borderline BP  Dr Darryl Luna questions whether pt could have had a PE despite the eliquis with the SOB and elevated troponin  Serial troponins  Echo  ? May need a cath  No PE on CTA    Lightheadedness/fall POA  [place] CT scan negative   PT/OT consults    Diabetes type 2 POA HgBa1c 5.6  SSI  HgBa1c 94, 94, 137    Code status: Full  Prophylaxis: Lovenox  Recommended Disposition: Home w/Family     Subjective:     Chief Complaint / Reason for Physician Visit f/u severe sepsis  \"Ifeel a bit better. Less cough and SOB  No SSCP or abdominal pain  still with intermittent diarrhea  No HA or N/V    Discussed with RN events overnight. Review of Systems:  Symptom Y/N Comments  Symptom Y/N Comments   Fever/Chills y   Chest Pain n    Poor Appetite    Edema     Cough y Mild  Abdominal Pain n    Sputum    Joint Pain     SOB/STILL n   Pruritis/Rash     Nausea/vomit n   Tolerating PT/OT     Diarrhea Y   Tolerating Diet Yes    Constipation    Other       Could NOT obtain due to:      Objective:     VITALS:   Last 24hrs VS reviewed since prior progress note.  Most recent are:  Patient Vitals for the past 24 hrs:   Temp Pulse Resp BP SpO2   07/09/17 2029 98.9 °F (37.2 °C) 87 20 122/62 93 %   07/09/17 1927 - - - - 92 %   07/09/17 1525 97.5 °F (36.4 °C) 83 22 117/68 98 %   07/09/17 1503 - - - - 94 %   07/09/17 1204 98.2 °F (36.8 °C) 83 21 121/71 97 %   07/09/17 1152 - - - - 97 %   07/09/17 0743 - - - - 92 %   07/09/17 0458 - - - - 95 %   07/09/17 0436 - - - - 93 %   07/09/17 0310 100.1 °F (37.8 °C) 90 19 110/54 90 %   07/09/17 0026 - - - - 92 %   07/08/17 2316 100 °F (37.8 °C) 91 19 108/54 92 %       Intake/Output Summary (Last 24 hours) at 07/09/17 2157  Last data filed at 07/09/17 0320   Gross per 24 hour   Intake          1723.75 ml   Output                0 ml   Net          1723.75 ml        Wt Readings from Last 12 Encounters:   07/09/17 96.4 kg (212 lb 8.4 oz)   07/06/17 92.1 kg (203 lb)   10/12/16 86.8 kg (191 lb 5.8 oz)   05/11/15 90.3 kg (199 lb)   09/18/14 91.6 kg (202 lb)   04/28/14 91.6 kg (202 lb)   11/25/13 90.7 kg (200 lb)   10/29/13 91.6 kg (202 lb)   10/21/13 92.1 kg (203 lb)   04/15/13 91.6 kg (202 lb)   10/15/12 88.9 kg (196 lb)   04/11/12 87.5 kg (193 lb)       PHYSICAL EXAM:  General: WD, WN. Alert, cooperative, no acute distress    EENT:  PERRL. Anicteric sclerae. MMM  Resp:  Crackles at bases bilaterally, no wheezing. No accessory muscle use  CV:  Regular  rhythm,  No edema  GI:  Soft, mildly distended, Non tender.  +Bowel sounds, no rebound  Neurologic:  Alert and oriented X 3, normal speech, non focal motor exam  Psych:   Good insight. Not anxious nor agitated  MS:  No joint swelling or erythema  Skin:  No rashes.   No jaundice    Reviewed most current lab test results and cultures  YES  Reviewed most current radiology test results   YES  Review and summation of old records today    NO  Reviewed patient's current orders and MAR    YES  PMH/SH reviewed - no change compared to H&P  ________________________________________________________________________  Care Plan discussed with:    Comments   Patient x    Family  x Ex-wife   RN x    Care Manager     Consultant  x                      Multidiciplinary team rounds were held today with , nursing, pharmacist and clinical coordinator. Patient's plan of care was discussed; medications were reviewed and discharge planning was addressed. ________________________________________________________________________  Total NON critical care TIME: 35   Minutes    Total CRITICAL CARE TIME Spent:   Minutes non procedure based      Comments   >50% of visit spent in counseling and coordination of care     ________________________________________________________________________  Emily Parker MD     Procedures: see electronic medical records for all procedures/Xrays and details which were not copied into this note but were reviewed prior to creation of Plan. LABS:  I reviewed today's most current labs and imaging studies.   Pertinent labs include:  Recent Labs      07/09/17   0350  07/08/17   0424  07/07/17   1624   WBC  16.0*  19.8*  22.2*   HGB  11.6*  12.3  13.2   HCT  33.5*  36.0*  37.8   PLT  118*  131*  149*     Recent Labs      07/09/17   0350  07/08/17   0424  07/07/17   1624   NA  136  137  131*   K  4.0  3.6  3.9   CL  110*  108  100   CO2  18*  19*  22   GLU  94  151*  153*   BUN  17  19  18   CREA  1.22  1.32*  1.39*   CA  8.3*  8.5  8.4*   MG   --    --   1.9   ALB   --   2.2*  2.7*   TBILI   --   0.5  0.9   SGOT   --   39*  42*   ALT   --   68  76

## 2017-07-10 NOTE — PROGRESS NOTES
ADULT PROTOCOL: JET AEROSOL ASSESSMENT    Patient  Maxine Danielle     [de-identified] y.o.   male     7/10/2017  11:07 AM    Breath Sounds Pre Procedure: Right Breath Sounds: Clear, Diminished                               Left Breath Sounds: Clear, Diminished    Breath Sounds Post Procedure: Right Breath Sounds: Clear, Diminished                                 Left Breath Sounds: Clear, Diminished    Breathing pattern: Pre procedure Breathing Pattern: Regular          Post procedure Breathing Pattern: Regular    Heart Rate: Pre procedure Pulse: 88           Post procedure Pulse: 84    Resp Rate: Pre procedure Respirations: 16           Post procedure Respirations: 16            Cough: Pre procedure Cough: Non-productive               Post procedure Cough: Non-productive                   Post procedure      Oxygen: O2 Device: Nasal cannula   Flow rate (L/min) 1.5 lpm     Changed: NO    SpO2: Pre procedure SpO2: 96 %   with oxygen              Post procedure SpO2: 97 %  with oxygen    Nebulizer Therapy: Current medications Aerosolized Medications: Albuterol      Changed: YES    Smoking History: unknown    Problem List:   Patient Active Problem List   Diagnosis Code    HTN (hypertension) I10    Hypothyroidism E03.9    Urinary incontinence R32    Elevated lipids E78.5    DJD (degenerative joint disease) M19.90    History of prostate cancer Z85.46    Paroxysmal atrial fibrillation (HCC) I48.0    Eczema L30.9    DM (diabetes mellitus) (HCC) E11.9    Essential hypertension I10    Pneumonia J18.9       Respiratory Therapist: RT Andres

## 2017-07-10 NOTE — PROGRESS NOTES
Problem: Mobility Impaired (Adult and Pediatric)  Goal: *Acute Goals and Plan of Care (Insert Text)  Physical Therapy Goals  Initiated 7/8/2017  1. Patient will move from supine to sit and sit to supine in bed with independence within 7 day(s). 2. Patient will transfer from bed to chair and chair to bed with modified independence using the least restrictive device within 7 day(s). 3. Patient will perform sit to stand with modified independence within 7 day(s). 4. Patient will ambulate with modified independence for 250 feet with the least restrictive device within 7 day(s). 5. Patient will ascend/descend 4 stairs with 1 handrail(s) with independence within 7 day(s). PHYSICAL THERAPY TREATMENT  Patient: Mian Gibson (17 y.o. male)  Date: 7/10/2017  Diagnosis: Pneumonia        Precautions:  falls  Chart, physical therapy assessment, plan of care and goals were reviewed. ASSESSMENT:pt tolerated tx well, no LOB, min SOB, reviewed pursed lip breathing, does well with bed mob and transfers, vc's for safety and proper RW use. Progression toward goals:  [ ]      Improving appropriately and progressing toward goals  [X]      Improving slowly and progressing toward goals  [ ]      Not making progress toward goals and plan of care will be adjusted       PLAN:  Patient continues to benefit from skilled intervention to address the above impairments. Continue treatment per established plan of care.   Discharge Recommendations:  Home Health  Further Equipment Recommendations for Discharge:  rolling walker       OBJECTIVE DATA SUMMARY:      Critical Behavior:  Neurologic State: Alert  Orientation Level: Oriented X4  Cognition: Follows commands, Appropriate decision making, Appropriate for age attention/concentration, Appropriate safety awareness  Safety/Judgement: Awareness of environment, Good awareness of safety precautions, Fall prevention, Insight into deficits      Functional Mobility Training:  Bed Mobility:  Rolling: Modified independent  Supine to Sit: Modified independent  Sit to Supine: Independent  Scooting: Modified independent  Level of Assistance: Modified independent              Interventions: Verbal cues      Transfers:  Sit to Stand: Contact guard assistance  Stand to Sit: Stand-by asssistance  Interventions: Verbal cues; Tactile cues  Level of Assistance: Contact guard assistance      Balance:  Sitting: Intact; Without support  Standing: Intact; With support  Standing - Static: Good;Constant support  Standing - Dynamic : Good      Ambulation/Gait Training:  Distance (ft): 140 Feet (ft)  Assistive Device: Gait belt;Walker, rolling  Ambulation - Level of Assistance: Contact guard assistance;Stand-by asssistance  Gait Abnormalities: Decreased step clearance  Right Side Weight Bearing: Full  Left Side Weight Bearing: Full  Base of Support: Narrowed  Stance:  (equal)  Speed/Muriel: Pace decreased (<100 feet/min)  Step Length: Left shortened;Right shortened     Pain:  Pain Scale 1: Numeric (0 - 10)  Pain Intensity 1: 0     Activity Tolerance: fair     After treatment:   [ ] Patient left in no apparent distress sitting up in chair  [X] Patient left in no apparent distress in bed  [X] Call bell left within reach  [X] Nursing notified  [X] Caregiver present  [ ] Bed alarm activated      COMMUNICATION/COLLABORATION:   The patients plan of care was discussed with: Registered Nurse     Matthew Crane PTA   Time Calculation: 20 mins

## 2017-07-11 LAB
ANION GAP BLD CALC-SCNC: 8 MMOL/L (ref 5–15)
BASOPHILS # BLD AUTO: 0.1 K/UL (ref 0–0.1)
BASOPHILS # BLD: 1 % (ref 0–1)
BUN SERPL-MCNC: 12 MG/DL (ref 6–20)
BUN/CREAT SERPL: 11 (ref 12–20)
CALCIUM SERPL-MCNC: 8.6 MG/DL (ref 8.5–10.1)
CHLORIDE SERPL-SCNC: 108 MMOL/L (ref 97–108)
CO2 SERPL-SCNC: 23 MMOL/L (ref 21–32)
CREAT SERPL-MCNC: 1.06 MG/DL (ref 0.7–1.3)
DIFFERENTIAL METHOD BLD: ABNORMAL
EOSINOPHIL # BLD: 1.7 K/UL (ref 0–0.4)
EOSINOPHIL NFR BLD: 13 % (ref 0–7)
ERYTHROCYTE [DISTWIDTH] IN BLOOD BY AUTOMATED COUNT: 13.9 % (ref 11.5–14.5)
GLUCOSE BLD STRIP.AUTO-MCNC: 101 MG/DL (ref 65–100)
GLUCOSE BLD STRIP.AUTO-MCNC: 103 MG/DL (ref 65–100)
GLUCOSE BLD STRIP.AUTO-MCNC: 105 MG/DL (ref 65–100)
GLUCOSE BLD STRIP.AUTO-MCNC: 109 MG/DL (ref 65–100)
GLUCOSE BLD STRIP.AUTO-MCNC: 117 MG/DL (ref 65–100)
GLUCOSE SERPL-MCNC: 101 MG/DL (ref 65–100)
HCT VFR BLD AUTO: 34.6 % (ref 36.6–50.3)
HGB BLD-MCNC: 11.8 G/DL (ref 12.1–17)
LYMPHOCYTES # BLD AUTO: 31 % (ref 12–49)
LYMPHOCYTES # BLD: 3.9 K/UL (ref 0.8–3.5)
MCH RBC QN AUTO: 33.3 PG (ref 26–34)
MCHC RBC AUTO-ENTMCNC: 34.1 G/DL (ref 30–36.5)
MCV RBC AUTO: 97.7 FL (ref 80–99)
MONOCYTES # BLD: 1.1 K/UL (ref 0–1)
MONOCYTES NFR BLD AUTO: 9 % (ref 5–13)
NEUTS SEG # BLD: 5.9 K/UL (ref 1.8–8)
NEUTS SEG NFR BLD AUTO: 46 % (ref 32–75)
PLATELET # BLD AUTO: 115 K/UL (ref 150–400)
POTASSIUM SERPL-SCNC: 3.5 MMOL/L (ref 3.5–5.1)
RBC # BLD AUTO: 3.54 M/UL (ref 4.1–5.7)
RBC MORPH BLD: ABNORMAL
SERVICE CMNT-IMP: ABNORMAL
SODIUM SERPL-SCNC: 139 MMOL/L (ref 136–145)
WBC # BLD AUTO: 12.7 K/UL (ref 4.1–11.1)
WBC MORPH BLD: ABNORMAL

## 2017-07-11 PROCEDURE — 74011000250 HC RX REV CODE- 250: Performed by: INTERNAL MEDICINE

## 2017-07-11 PROCEDURE — 74011000250 HC RX REV CODE- 250: Performed by: FAMILY MEDICINE

## 2017-07-11 PROCEDURE — 97116 GAIT TRAINING THERAPY: CPT

## 2017-07-11 PROCEDURE — 74011636320 HC RX REV CODE- 636/320

## 2017-07-11 PROCEDURE — 94640 AIRWAY INHALATION TREATMENT: CPT

## 2017-07-11 PROCEDURE — 74011000250 HC RX REV CODE- 250

## 2017-07-11 PROCEDURE — C1769 GUIDE WIRE: HCPCS

## 2017-07-11 PROCEDURE — 85025 COMPLETE CBC W/AUTO DIFF WBC: CPT | Performed by: INTERNAL MEDICINE

## 2017-07-11 PROCEDURE — C1894 INTRO/SHEATH, NON-LASER: HCPCS

## 2017-07-11 PROCEDURE — 74011000258 HC RX REV CODE- 258: Performed by: FAMILY MEDICINE

## 2017-07-11 PROCEDURE — 77030015766

## 2017-07-11 PROCEDURE — 77030010221 HC SPLNT WR POS TELE -B

## 2017-07-11 PROCEDURE — 77030019569 HC BND COMPR RAD TERU -B

## 2017-07-11 PROCEDURE — 82962 GLUCOSE BLOOD TEST: CPT

## 2017-07-11 PROCEDURE — 65660000000 HC RM CCU STEPDOWN

## 2017-07-11 PROCEDURE — 74011000258 HC RX REV CODE- 258: Performed by: INTERNAL MEDICINE

## 2017-07-11 PROCEDURE — 94762 N-INVAS EAR/PLS OXIMTRY CONT: CPT

## 2017-07-11 PROCEDURE — 77030019698 HC SYR ANGI MDLON MRTM -A

## 2017-07-11 PROCEDURE — 74011250636 HC RX REV CODE- 250/636: Performed by: FAMILY MEDICINE

## 2017-07-11 PROCEDURE — 74011250636 HC RX REV CODE- 250/636

## 2017-07-11 PROCEDURE — 74011250636 HC RX REV CODE- 250/636: Performed by: INTERNAL MEDICINE

## 2017-07-11 PROCEDURE — B2111ZZ FLUOROSCOPY OF MULTIPLE CORONARY ARTERIES USING LOW OSMOLAR CONTRAST: ICD-10-PCS | Performed by: INTERNAL MEDICINE

## 2017-07-11 PROCEDURE — 74011250637 HC RX REV CODE- 250/637: Performed by: INTERNAL MEDICINE

## 2017-07-11 PROCEDURE — 74011250637 HC RX REV CODE- 250/637: Performed by: FAMILY MEDICINE

## 2017-07-11 PROCEDURE — 77030008543 HC TBNG MON PRSS MRTM -A

## 2017-07-11 PROCEDURE — C1874 STENT, COATED/COV W/DEL SYS: HCPCS

## 2017-07-11 PROCEDURE — 77030019697 HC SYR ANGI INFL MRTM -B

## 2017-07-11 PROCEDURE — 77030029684 HC NEB SM VOL KT MONA -A

## 2017-07-11 PROCEDURE — 93005 ELECTROCARDIOGRAM TRACING: CPT

## 2017-07-11 PROCEDURE — 36415 COLL VENOUS BLD VENIPUNCTURE: CPT | Performed by: INTERNAL MEDICINE

## 2017-07-11 PROCEDURE — B2151ZZ FLUOROSCOPY OF LEFT HEART USING LOW OSMOLAR CONTRAST: ICD-10-PCS | Performed by: INTERNAL MEDICINE

## 2017-07-11 PROCEDURE — 85347 COAGULATION TIME ACTIVATED: CPT

## 2017-07-11 PROCEDURE — 80048 BASIC METABOLIC PNL TOTAL CA: CPT | Performed by: INTERNAL MEDICINE

## 2017-07-11 PROCEDURE — 4A023N7 MEASUREMENT OF CARDIAC SAMPLING AND PRESSURE, LEFT HEART, PERCUTANEOUS APPROACH: ICD-10-PCS | Performed by: INTERNAL MEDICINE

## 2017-07-11 PROCEDURE — C1725 CATH, TRANSLUMIN NON-LASER: HCPCS

## 2017-07-11 PROCEDURE — 74011250637 HC RX REV CODE- 250/637

## 2017-07-11 PROCEDURE — 77030004549 HC CATH ANGI DX PRF MRTM -A

## 2017-07-11 PROCEDURE — 99152 MOD SED SAME PHYS/QHP 5/>YRS: CPT

## 2017-07-11 PROCEDURE — 77030028837 HC SYR ANGI PWR INJ COEU -A

## 2017-07-11 PROCEDURE — C1887 CATHETER, GUIDING: HCPCS

## 2017-07-11 PROCEDURE — 027034Z DILATION OF CORONARY ARTERY, ONE ARTERY WITH DRUG-ELUTING INTRALUMINAL DEVICE, PERCUTANEOUS APPROACH: ICD-10-PCS | Performed by: INTERNAL MEDICINE

## 2017-07-11 RX ORDER — LOPERAMIDE HYDROCHLORIDE 2 MG/1
2 CAPSULE ORAL ONCE
Status: COMPLETED | OUTPATIENT
Start: 2017-07-11 | End: 2017-07-11

## 2017-07-11 RX ORDER — HEPARIN SODIUM 200 [USP'U]/100ML
INJECTION, SOLUTION INTRAVENOUS
Status: COMPLETED
Start: 2017-07-11 | End: 2017-07-11

## 2017-07-11 RX ORDER — LIDOCAINE HYDROCHLORIDE 10 MG/ML
INJECTION, SOLUTION EPIDURAL; INFILTRATION; INTRACAUDAL; PERINEURAL
Status: COMPLETED
Start: 2017-07-11 | End: 2017-07-11

## 2017-07-11 RX ORDER — SODIUM CHLORIDE 9 MG/ML
INJECTION, SOLUTION INTRAVENOUS
Status: DISCONTINUED
Start: 2017-07-11 | End: 2017-07-11

## 2017-07-11 RX ORDER — VERAPAMIL HYDROCHLORIDE 2.5 MG/ML
INJECTION, SOLUTION INTRAVENOUS
Status: COMPLETED
Start: 2017-07-11 | End: 2017-07-11

## 2017-07-11 RX ORDER — ACETAMINOPHEN 325 MG/1
650 TABLET ORAL
Status: DISCONTINUED | OUTPATIENT
Start: 2017-07-11 | End: 2017-07-11 | Stop reason: SDUPTHER

## 2017-07-11 RX ORDER — MIDAZOLAM HYDROCHLORIDE 1 MG/ML
.5-2 INJECTION, SOLUTION INTRAMUSCULAR; INTRAVENOUS
Status: DISCONTINUED | OUTPATIENT
Start: 2017-07-11 | End: 2017-07-11

## 2017-07-11 RX ORDER — HEPARIN SODIUM 1000 [USP'U]/ML
2500 INJECTION, SOLUTION INTRAVENOUS; SUBCUTANEOUS ONCE
Status: COMPLETED | OUTPATIENT
Start: 2017-07-11 | End: 2017-07-11

## 2017-07-11 RX ORDER — SODIUM CHLORIDE 0.9 % (FLUSH) 0.9 %
5-10 SYRINGE (ML) INJECTION AS NEEDED
Status: DISCONTINUED | OUTPATIENT
Start: 2017-07-11 | End: 2017-07-14 | Stop reason: HOSPADM

## 2017-07-11 RX ORDER — SODIUM CHLORIDE 9 MG/ML
INJECTION, SOLUTION INTRAVENOUS
Status: DISCONTINUED
Start: 2017-07-11 | End: 2017-07-14 | Stop reason: HOSPADM

## 2017-07-11 RX ORDER — HEPARIN SODIUM 200 [USP'U]/100ML
500 INJECTION, SOLUTION INTRAVENOUS ONCE
Status: COMPLETED | OUTPATIENT
Start: 2017-07-11 | End: 2017-07-11

## 2017-07-11 RX ORDER — BIVALIRUDIN 250 MG/5ML
INJECTION, POWDER, LYOPHILIZED, FOR SOLUTION INTRAVENOUS
Status: DISCONTINUED
Start: 2017-07-11 | End: 2017-07-11

## 2017-07-11 RX ORDER — FUROSEMIDE 10 MG/ML
20 INJECTION INTRAMUSCULAR; INTRAVENOUS DAILY
Status: DISCONTINUED | OUTPATIENT
Start: 2017-07-11 | End: 2017-07-12

## 2017-07-11 RX ORDER — LIDOCAINE HYDROCHLORIDE 10 MG/ML
1-30 INJECTION, SOLUTION EPIDURAL; INFILTRATION; INTRACAUDAL; PERINEURAL
Status: DISCONTINUED | OUTPATIENT
Start: 2017-07-11 | End: 2017-07-11

## 2017-07-11 RX ORDER — NALOXONE HYDROCHLORIDE 0.4 MG/ML
0.4 INJECTION, SOLUTION INTRAMUSCULAR; INTRAVENOUS; SUBCUTANEOUS AS NEEDED
Status: DISCONTINUED | OUTPATIENT
Start: 2017-07-11 | End: 2017-07-14 | Stop reason: HOSPADM

## 2017-07-11 RX ORDER — CLOPIDOGREL 300 MG/1
TABLET, FILM COATED ORAL
Status: COMPLETED
Start: 2017-07-11 | End: 2017-07-11

## 2017-07-11 RX ORDER — HEPARIN SODIUM 1000 [USP'U]/ML
INJECTION, SOLUTION INTRAVENOUS; SUBCUTANEOUS
Status: COMPLETED
Start: 2017-07-11 | End: 2017-07-11

## 2017-07-11 RX ORDER — FENTANYL CITRATE 50 UG/ML
25-50 INJECTION, SOLUTION INTRAMUSCULAR; INTRAVENOUS
Status: DISCONTINUED | OUTPATIENT
Start: 2017-07-11 | End: 2017-07-11

## 2017-07-11 RX ORDER — CLOPIDOGREL BISULFATE 75 MG/1
75 TABLET ORAL DAILY
Status: DISCONTINUED | OUTPATIENT
Start: 2017-07-12 | End: 2017-07-14 | Stop reason: HOSPADM

## 2017-07-11 RX ORDER — SODIUM CHLORIDE 0.9 % (FLUSH) 0.9 %
5-10 SYRINGE (ML) INJECTION EVERY 8 HOURS
Status: DISCONTINUED | OUTPATIENT
Start: 2017-07-11 | End: 2017-07-14 | Stop reason: HOSPADM

## 2017-07-11 RX ORDER — ADENOSINE 3 MG/ML
INJECTION, SOLUTION INTRAVENOUS
Status: DISCONTINUED
Start: 2017-07-11 | End: 2017-07-11

## 2017-07-11 RX ORDER — FENTANYL CITRATE 50 UG/ML
INJECTION, SOLUTION INTRAMUSCULAR; INTRAVENOUS
Status: COMPLETED
Start: 2017-07-11 | End: 2017-07-11

## 2017-07-11 RX ORDER — MIDAZOLAM HYDROCHLORIDE 1 MG/ML
INJECTION, SOLUTION INTRAMUSCULAR; INTRAVENOUS
Status: COMPLETED
Start: 2017-07-11 | End: 2017-07-11

## 2017-07-11 RX ORDER — CLOPIDOGREL 300 MG/1
600 TABLET, FILM COATED ORAL ONCE
Status: COMPLETED | OUTPATIENT
Start: 2017-07-11 | End: 2017-07-11

## 2017-07-11 RX ORDER — VERAPAMIL HYDROCHLORIDE 2.5 MG/ML
2.5 INJECTION, SOLUTION INTRAVENOUS ONCE
Status: COMPLETED | OUTPATIENT
Start: 2017-07-11 | End: 2017-07-11

## 2017-07-11 RX ORDER — ONDANSETRON 2 MG/ML
4 INJECTION INTRAMUSCULAR; INTRAVENOUS
Status: DISCONTINUED | OUTPATIENT
Start: 2017-07-11 | End: 2017-07-11 | Stop reason: SDUPTHER

## 2017-07-11 RX ADMIN — IOPAMIDOL 10 ML: 755 INJECTION, SOLUTION INTRAVENOUS at 14:29

## 2017-07-11 RX ADMIN — CEFTRIAXONE 1 G: 1 INJECTION, POWDER, FOR SOLUTION INTRAMUSCULAR; INTRAVENOUS at 22:47

## 2017-07-11 RX ADMIN — NITROGLYCERIN 200 MCG: 5 INJECTION, SOLUTION INTRAVENOUS at 14:23

## 2017-07-11 RX ADMIN — Medication 10 ML: at 14:56

## 2017-07-11 RX ADMIN — HEPARIN SODIUM 1000 UNITS: 200 INJECTION, SOLUTION INTRAVENOUS at 13:41

## 2017-07-11 RX ADMIN — METOPROLOL SUCCINATE 25 MG: 25 TABLET, EXTENDED RELEASE ORAL at 09:00

## 2017-07-11 RX ADMIN — ASPIRIN 81 MG CHEWABLE TABLET 81 MG: 81 TABLET CHEWABLE at 09:00

## 2017-07-11 RX ADMIN — FENTANYL CITRATE 25 MCG: 50 INJECTION, SOLUTION INTRAMUSCULAR; INTRAVENOUS at 13:33

## 2017-07-11 RX ADMIN — LIDOCAINE HYDROCHLORIDE 1 ML: 10 INJECTION, SOLUTION EPIDURAL; INFILTRATION; INTRACAUDAL; PERINEURAL at 13:46

## 2017-07-11 RX ADMIN — DOXYCYCLINE 100 MG: 100 INJECTION, POWDER, LYOPHILIZED, FOR SOLUTION INTRAVENOUS at 00:05

## 2017-07-11 RX ADMIN — MIDAZOLAM HYDROCHLORIDE 1 MG: 1 INJECTION, SOLUTION INTRAMUSCULAR; INTRAVENOUS at 13:44

## 2017-07-11 RX ADMIN — IOPAMIDOL 120 ML: 755 INJECTION, SOLUTION INTRAVENOUS at 14:23

## 2017-07-11 RX ADMIN — FLECAINIDE ACETATE 50 MG: 100 TABLET ORAL at 09:00

## 2017-07-11 RX ADMIN — LEVOTHYROXINE SODIUM 25 MCG: 25 TABLET ORAL at 07:30

## 2017-07-11 RX ADMIN — LOPERAMIDE HYDROCHLORIDE 2 MG: 2 CAPSULE ORAL at 08:52

## 2017-07-11 RX ADMIN — SODIUM CHLORIDE 75 ML/HR: 900 INJECTION, SOLUTION INTRAVENOUS at 08:50

## 2017-07-11 RX ADMIN — HEPARIN SODIUM 2500 UNITS: 1000 INJECTION, SOLUTION INTRAVENOUS; SUBCUTANEOUS at 13:51

## 2017-07-11 RX ADMIN — ALBUTEROL SULFATE 2.5 MG: 2.5 SOLUTION RESPIRATORY (INHALATION) at 08:41

## 2017-07-11 RX ADMIN — HEPARIN SODIUM 1000 UNITS: 200 INJECTION, SOLUTION INTRAVENOUS at 13:42

## 2017-07-11 RX ADMIN — FLECAINIDE ACETATE 50 MG: 100 TABLET ORAL at 17:31

## 2017-07-11 RX ADMIN — Medication 10 ML: at 22:49

## 2017-07-11 RX ADMIN — MIDAZOLAM HYDROCHLORIDE 1 MG: 1 INJECTION, SOLUTION INTRAMUSCULAR; INTRAVENOUS at 13:33

## 2017-07-11 RX ADMIN — CLOPIDOGREL BISULFATE 600 MG: 300 TABLET, FILM COATED ORAL at 14:14

## 2017-07-11 RX ADMIN — NITROGLYCERIN 1 INCH: 20 OINTMENT TOPICAL at 17:31

## 2017-07-11 RX ADMIN — CLOPIDOGREL 600 MG: 300 TABLET, FILM COATED ORAL at 14:14

## 2017-07-11 RX ADMIN — NITROGLYCERIN 200 MCG: 5 INJECTION, SOLUTION INTRAVENOUS at 13:50

## 2017-07-11 RX ADMIN — BIVALIRUDIN 0.5 MG/KG/HR: 250 INJECTION, POWDER, LYOPHILIZED, FOR SOLUTION INTRAVENOUS at 14:32

## 2017-07-11 RX ADMIN — DOXYCYCLINE 100 MG: 100 INJECTION, POWDER, LYOPHILIZED, FOR SOLUTION INTRAVENOUS at 12:04

## 2017-07-11 RX ADMIN — DOXYCYCLINE 100 MG: 100 INJECTION, POWDER, LYOPHILIZED, FOR SOLUTION INTRAVENOUS at 22:47

## 2017-07-11 RX ADMIN — NITROGLYCERIN 1 INCH: 20 OINTMENT TOPICAL at 13:54

## 2017-07-11 RX ADMIN — VERAPAMIL HYDROCHLORIDE 2.5 MG: 2.5 INJECTION, SOLUTION INTRAVENOUS at 13:50

## 2017-07-11 RX ADMIN — IOPAMIDOL 30 ML: 755 INJECTION, SOLUTION INTRAVENOUS at 14:04

## 2017-07-11 RX ADMIN — MIDAZOLAM HYDROCHLORIDE 1 MG: 1 INJECTION INTRAMUSCULAR; INTRAVENOUS at 13:33

## 2017-07-11 RX ADMIN — VERAPAMIL HYDROCHLORIDE 2.5 MG: 2.5 INJECTION INTRAVENOUS at 13:50

## 2017-07-11 RX ADMIN — MIDAZOLAM HYDROCHLORIDE 1 MG: 1 INJECTION, SOLUTION INTRAMUSCULAR; INTRAVENOUS at 14:15

## 2017-07-11 RX ADMIN — FENTANYL CITRATE 25 MCG: 50 INJECTION, SOLUTION INTRAMUSCULAR; INTRAVENOUS at 13:45

## 2017-07-11 RX ADMIN — FUROSEMIDE 20 MG: 10 INJECTION, SOLUTION INTRAMUSCULAR; INTRAVENOUS at 14:55

## 2017-07-11 RX ADMIN — Medication 10 ML: at 05:23

## 2017-07-11 RX ADMIN — BIVALIRUDIN 1.75 MG/KG/HR: 250 INJECTION, POWDER, LYOPHILIZED, FOR SOLUTION INTRAVENOUS at 14:06

## 2017-07-11 RX ADMIN — ALBUTEROL SULFATE 2.5 MG: 2.5 SOLUTION RESPIRATORY (INHALATION) at 21:09

## 2017-07-11 NOTE — PROGRESS NOTES
Problem: Mobility Impaired (Adult and Pediatric)  Goal: *Acute Goals and Plan of Care (Insert Text)  Physical Therapy Goals  Initiated 7/8/2017  1. Patient will move from supine to sit and sit to supine in bed with independence within 7 day(s). 2. Patient will transfer from bed to chair and chair to bed with modified independence using the least restrictive device within 7 day(s). 3. Patient will perform sit to stand with modified independence within 7 day(s). 4. Patient will ambulate with modified independence for 250 feet with the least restrictive device within 7 day(s). 5. Patient will ascend/descend 4 stairs with 1 handrail(s) with independence within 7 day(s). PHYSICAL THERAPY TREATMENT  Patient: Heather Bruno (27 y.o. male)  Date: 7/11/2017  Diagnosis: Pneumonia <principal problem not specified>       Precautions:        ASSESSMENT:  Pt received supine in bed with family and RN present and agreeable to PT intervention. Pt cleared by nursing for mobility, however to remain on continuous pulse ox monitoring. Pt performed bed mobility mod I and transfers with SBA-CGA. Pt provided with RW for gait. Pt ambulated 160' total with SBA and using RW, requiring 2 brief standing rest breaks due to fatigue and SOB. Pt with overall safe mobility this date, however continues to be limited by decreased activity tolerance. SaO2 >90% throughout on RA. Pt was assisted into bedside chair following gait training and educated on LE exercises to perform while sitting up in chair. Pt was left with all needs met, family present, and RN aware. Recommend patient discharge home with family support, HHPT, and use of RW.      Progression toward goals:  [ ]    Improving appropriately and progressing toward goals  [X]    Improving slowly and progressing toward goals  [ ]    Not making progress toward goals and plan of care will be adjusted       PLAN:  Patient continues to benefit from skilled intervention to address the above impairments. Continue treatment per established plan of care. Discharge Recommendations:  Home Health  Further Equipment Recommendations for Discharge:  rolling walker       SUBJECTIVE:   Patient stated Kayla Medina had a little SOB.       OBJECTIVE DATA SUMMARY:   Critical Behavior:  Neurologic State: Alert  Orientation Level: Oriented X4  Cognition: Appropriate decision making, Appropriate for age attention/concentration, Appropriate safety awareness, Follows commands  Safety/Judgement: Awareness of environment, Good awareness of safety precautions, Fall prevention, Insight into deficits  Functional Mobility Training:  Bed Mobility:  Rolling: Modified independent  Supine to Sit: Modified independent     Scooting: Modified independent        Transfers:  Sit to Stand: Stand-by asssistance  Stand to Sit: Stand-by asssistance        Bed to Chair: Contact guard assistance                    Balance:  Sitting: Intact; Without support  Standing: Intact; With support  Standing - Static: Good;Constant support  Standing - Dynamic : Good (with RW support)  Ambulation/Gait Training:  Distance (ft): 160 Feet (ft) (2 standing rest breaks)  Assistive Device: Gait belt;Walker, rolling  Ambulation - Level of Assistance: Stand-by asssistance; Additional time; Adaptive equipment        Gait Abnormalities: Decreased step clearance        Base of Support: Narrowed     Speed/Muriel: Pace decreased (<100 feet/min)  Step Length: Left shortened;Right shortened     Therapeutic Exercises:   Educated pt on performing ankle pumps, LAQ, seated hip flexion, and glut sets while sitting in chair. Pain:  Pain Scale 1: Numeric (0 - 10)  Pain Intensity 1: 0              Activity Tolerance:   Fair - pt with SOB with increased distance ambulated, requiring 2 standing rest breaks; SaO2 >90% throughout on RA  Please refer to the flowsheet for vital signs taken during this treatment.   After treatment:   [X]    Patient left in no apparent distress sitting up in chair  [ ]    Patient left in no apparent distress in bed  [X]    Call bell left within reach  [X]    Nursing notified  [X]    Caregiver present  [ ]    Bed alarm activated      COMMUNICATION/COLLABORATION:   The patients plan of care was discussed with: Physical Therapist and Registered Nurse     Alida Boland, PT, DPT   Time Calculation: 21 mins

## 2017-07-11 NOTE — PROCEDURES
BRIEF OPERATIVE NOTE    Date of Procedure: 7/11/2017   Procedure: Cardiac catheterization    Preoperative Diagnosis: Coronary artery disease  Postoperative Diagnosis: Coronary artery disease     Surgeon/assistant: Lety Valdivia MD    Anesthesia: Conscious sedation  Estimated Blood Loss: <50cc  Specimens: None    Findings:   LVEDP: 25   Left ventricular angiography: 35%   Coronary angiography: 80% mid LAD   Intervention: PCI of mLAD with GORDY    Complications: None  Non-coronary Implants: None

## 2017-07-11 NOTE — PROGRESS NOTES
Hospitalist Progress Note    NAME: Anum Knox   :  1935   MRN:  [id number]     Admit date: 2017    Today's date: 17    PCP: Siva Bennett. Meredith Nelson DO          Assessment / Plan:    Severe sepsis POA WBC 22,200, HR 93, temp 102.7, lactate 2.3 . Possible bilateral lower lobe pneumonia POA  Diarrhea POA  Presents with nonspecific weakness, increasing SOB, mild cough  CT chest with subtle ASD at the [facility] remain negative  UA negative for UA  CT scan abdomen/pelvis negative for colitis or intraabdominal source  stool studies unremarkable  IV ceftriaxone and doxycycline(stop azithromycin on the flecainide) day 5      Change to cefuroxime/doxycycline to complete 7-10 days total at discharge  Diarrhea worsening on IV antibiotics       C difficile negativ thus started on as needed imodium    Non ST elevation MI POA peak troponin 5.59  Acute on chronic systolic CHF LVEF 35 (possible POA)  CAD with 80% mid LAD lesion s/p BMS PCI on afternoon of   Essential HTN POA  Chronic atrial fibrillation POA  ASA, Plavix  Lopressor  Continue flecainide  Hold cardura, captopril with borderline BP  D/W Dr Alberto Sepulveda  No PE on CTA  Diuresis per Cardiology    Lightheadedness/fall POA  [place] CT scan negative   PT/OT consults    Diabetes type 2 POA HgBa1c 5.6  SSI      Code status: Full  Prophylaxis: Lovenox  Recommended Disposition: Home w/Family     Subjective:     Chief Complaint / Reason for Physician Visit f/u severe sepsis  Patient seen after cardiac cath. Denies complaints at this time.     Review of Systems:  Symptom Y/N Comments  Symptom Y/N Comments   Fever/Chills    Chest Pain n    Poor Appetite    Edema     Cough y   Abdominal Pain n    Sputum    Joint Pain     SOB/STILL y 5 min post cath; better now  Pruritis/Rash     Nausea/vomit n   Tolerating PT/OT     Diarrhea    Tolerating Diet Yes    Constipation    Other       Could NOT obtain due to:      Objective:     VITALS:   Last 24hrs VS reviewed since prior progress note. Most recent are:  Patient Vitals for the past 24 hrs:   Temp Pulse Resp BP SpO2   07/11/17 1715 - 83 - 112/71 98 %   07/11/17 1700 - 87 - 128/65 98 %   07/11/17 1645 - 83 - 121/75 98 %   07/11/17 1615 - 91 - (!) 135/91 99 %   07/11/17 1600 - 82 - 128/78 99 %   07/11/17 1545 - 85 - 133/73 97 %   07/11/17 1530 - 85 - 125/73 99 %   07/11/17 1515 - 83 - 134/72 98 %   07/11/17 1500 - (!) 111 - 118/82 97 %   07/11/17 1455 - - - 116/71 97 %   07/11/17 1450 - 83 - 126/76 97 %   07/11/17 1445 - 83 - 121/74 96 %   07/11/17 1440 98.5 °F (36.9 °C) 88 20 129/69 93 %   07/11/17 1439 - - - 136/71 92 %   07/11/17 1055 98.9 °F (37.2 °C) 92 20 119/54 92 %   07/11/17 0851 - - - - 96 %   07/11/17 0841 - - - - 97 %   07/11/17 0734 98.2 °F (36.8 °C) 88 21 139/78 94 %   07/11/17 0334 98.5 °F (36.9 °C) 91 18 136/74 90 %   07/10/17 2329 98.6 °F (37 °C) 88 19 124/66 91 %   07/10/17 2128 - - - - 90 %   07/10/17 2022 98.3 °F (36.8 °C) 85 20 129/84 92 %       Intake/Output Summary (Last 24 hours) at 07/11/17 1746  Last data filed at 07/11/17 1647   Gross per 24 hour   Intake              275 ml   Output             1100 ml   Net             -825 ml        Wt Readings from Last 12 Encounters:   07/11/17 96.6 kg (212 lb 14.4 oz)   07/06/17 92.1 kg (203 lb)   10/12/16 86.8 kg (191 lb 5.8 oz)   05/11/15 90.3 kg (199 lb)   09/18/14 91.6 kg (202 lb)   04/28/14 91.6 kg (202 lb)   11/25/13 90.7 kg (200 lb)   10/29/13 91.6 kg (202 lb)   10/21/13 92.1 kg (203 lb)   04/15/13 91.6 kg (202 lb)   10/15/12 88.9 kg (196 lb)   04/11/12 87.5 kg (193 lb)       PHYSICAL EXAM:  General: WD, WN. Alert, cooperative, no acute distress    EENT:  PERRL. Anicteric sclerae. MMM  Resp:  Crackles at bases bilaterally, no wheezing.   No accessory muscle use  CV:  Regular  rhythm,  No edema  GI:  Soft, mildly distended, Non tender.  +Bowel sounds, no rebound  Neurologic:  Alert and oriented X 3, normal speech, non focal motor exam  Psych:   Good insight. Not anxious nor agitated  MS:  No joint swelling or erythema  Skin:  No rashes. No jaundice    Reviewed most current lab test results and cultures  YES  Reviewed most current radiology test results   YES  Review and summation of old records today    NO  Reviewed patient's current orders and MAR    YES  PMH/SH reviewed - no change compared to H&P  ________________________________________________________________________  Care Plan discussed with:    Comments   Patient x    Family  x Ex-wife   RN     Care Manager     Consultant  x Dr Bert Nur team rounds were held today with , nursing, pharmacist and clinical coordinator. Patient's plan of care was discussed; medications were reviewed and discharge planning was addressed. ________________________________________________________________________  Total NON critical care TIME: 25   Minutes    Total CRITICAL CARE TIME Spent:   Minutes non procedure based      Comments   >50% of visit spent in counseling and coordination of care     ________________________________________________________________________  Tamara Ross MD     Procedures: see electronic medical records for all procedures/Xrays and details which were not copied into this note but were reviewed prior to creation of Plan. LABS:  I reviewed today's most current labs and imaging studies.   Pertinent labs include:  Recent Labs      07/11/17 0339  07/10/17   0325  07/09/17   0350   WBC  12.7*  11.9*  16.0*   HGB  11.8*  11.4*  11.6*   HCT  34.6*  33.2*  33.5*   PLT  115*  115*  118*     Recent Labs      07/11/17 0339 07/10/17   0325  07/09/17   0350   NA  139  140  136   K  3.5  3.7  4.0   CL  108  110*  110*   CO2  23  22  18*   GLU  101*  94  94   BUN  12  16  17   CREA  1.06  1.11  1.22   CA  8.6  8.2*  8.3*

## 2017-07-11 NOTE — PROGRESS NOTES
Progress Note      7/11/2017 11:38 AM  NAME: Yoel Major   MRN:  [id number]   Admit Diagnosis: Pneumonia                          Assessment:                 1. Dyspnea, syncope, febrile, ?pneumonia, leukoctyosis, increased troponin suggestive of NSTEMI  2. Stress in 2010 negative  3. Echo 4/2017 EF 60% Echo 7/2017 EF 35%  4. Prox Afib/aflutter with RBBB/lAHB on flecainide, dig stopped in past due to bradycardia  5. DM  6. HTN  7. Dyslipidemia  8. CKD 1.3 Dr. Roly Malave  9. DANIELA in 2015 ok  10. Hypothyroid  11. Prostate CA  12. , very mild functional capacity  13. Cardiologist:  Dr. Jeovanny Griffin                            Plan:                  Unusual presentation. Dyspnea, mild cough, not productive, CT suggest pneumonia, febrile, leukocytosis, on Abx. Breathing improving. No chest pain, no acute ECG changes, but increased troponin. No edema, does not appear to be in CHF. Sinus so far, hx of bradycardia     No PE or clear source of infection on CT. Echo raises possiblity of Lisseth's  Plan cath Tuesday afternoon Radial approach.     1. Hold eliquis  2. Cont Added ASA  3. Cont Decreased flecainide to 50mg bid  4. Cont Added low dose metoprolol increase to 25mg  5. Hold captropril for now  6. Hydration today  7. Will decide on statin based on cath, intolerant of lovastatin  8. C.diff negative, imodium x 1 this AM.  .       [x]        High complexity decision making was performed         Subjective:     Yoel Major denies chest pain, dyspnea improving. Discussed with RN events overnight.      Review of Systems:    Symptom Y/N Comments  Symptom Y/N Comments   Fever/Chills N   Chest Pain N    Poor Appetite N   Edema N    Cough N   Abdominal Pain N    Sputum N   Joint Pain N    SOB/STILL N   Pruritis/Rash N    Nausea/vomit N   Tolerating PT/OT Y    Diarrhea N   Tolerating Diet Y    Constipation N   Other       Could NOT obtain due to:      Objective:      Physical Exam:    Last 24hrs VS reviewed since prior progress note. Most recent are:    Visit Vitals    /78 (BP 1 Location: Left arm, BP Patient Position: Sitting)    Pulse 88    Temp 98.2 °F (36.8 °C)    Resp 21    Ht 6' 2\" (1.88 m)    Wt 94.2 kg (207 lb 10.8 oz)    SpO2 94%    BMI 26.66 kg/m2       Intake/Output Summary (Last 24 hours) at 07/11/17 0836  Last data filed at 07/11/17 0334   Gross per 24 hour   Intake              515 ml   Output                0 ml   Net              515 ml        General Appearance: Well developed, well nourished, alert & oriented x 3,    no acute distress. Ears/Nose/Mouth/Throat: Hearing grossly normal.  Neck: Supple. Chest: Lungs clear to auscultation bilaterally. Cardiovascular: Regular rate and rhythm, S1S2 normal, no murmur. Abdomen: Soft, non-tender, bowel sounds are active. Extremities: No edema bilaterally. Skin: Warm and dry. PMH/SH reviewed - no change compared to H&P    Data Review    Telemetry: normal sinus rhythm     Lab Data Personally Reviewed:    Recent Labs      07/11/17   0339  07/10/17   0325   WBC  12.7*  11.9*   HGB  11.8*  11.4*   HCT  34.6*  33.2*   PLT  115*  115*     No results for input(s): INR, PTP, APTT in the last 72 hours. No lab exists for component: Lara Stair   Recent Labs      07/11/17   0339  07/10/17   0325  07/09/17   0350   NA  139  140  136   K  3.5  3.7  4.0   CL  108  110*  110*   CO2  23  22  18*   BUN  12  16  17   CREA  1.06  1.11  1.22   GLU  101*  94  94   CA  8.6  8.2*  8.3*     Recent Labs      07/09/17   0350  07/08/17   0915   CPK   --   623*   CKNDX  1.0   --    TROIQ  1.75*  3.03*     Lab Results   Component Value Date/Time    Cholesterol, total 118 07/09/2017 05:41 AM    HDL Cholesterol 31 07/09/2017 05:41 AM    LDL, calculated 70.2 07/09/2017 05:41 AM    Triglyceride 84 07/09/2017 05:41 AM    CHOL/HDL Ratio 3.8 07/09/2017 05:41 AM       No results for input(s): SGOT, GPT, AP, TBIL, TP, ALB, GLOB, GGT, AML, LPSE in the last 72 hours.     No lab exists for component: AMYP, HLPSE  No results for input(s): PH, PCO2, PO2 in the last 72 hours.     Medications Personally Reviewed:    Current Facility-Administered Medications   Medication Dose Route Frequency    loperamide (IMODIUM) capsule 2 mg  2 mg Oral ONCE    albuterol (PROVENTIL VENTOLIN) nebulizer solution 2.5 mg  2.5 mg Nebulization Q6HWA RT    albuterol (PROVENTIL VENTOLIN) nebulizer solution 2.5 mg  2.5 mg Nebulization Q4H PRN    metoprolol succinate (TOPROL-XL) XL tablet 25 mg  25 mg Oral DAILY    0.9% sodium chloride infusion  75 mL/hr IntraVENous CONTINUOUS    loperamide (IMODIUM) capsule 2 mg  2 mg Oral Q4H PRN    ondansetron (ZOFRAN) injection 4 mg  4 mg IntraVENous Q6H PRN    flecainide (TAMBOCOR) tablet 50 mg  50 mg Oral BID    doxycycline (VIBRAMYCIN) 100 mg in 0.9% sodium chloride (MBP/ADV) 100 mL  100 mg IntraVENous Q12H    sodium chloride (NS) flush 5-10 mL  5-10 mL IntraVENous PRN    levothyroxine (SYNTHROID) tablet 25 mcg  25 mcg Oral ACB    sodium chloride (NS) flush 5-10 mL  5-10 mL IntraVENous Q8H    sodium chloride (NS) flush 5-10 mL  5-10 mL IntraVENous PRN    cefTRIAXone (ROCEPHIN) 1 g in 0.9% sodium chloride (MBP/ADV) 50 mL  1 g IntraVENous Q24H    acetaminophen (TYLENOL) tablet 650 mg  650 mg Oral Q4H PRN    aspirin chewable tablet 81 mg  81 mg Oral DAILY    glucose chewable tablet 16 g  4 Tab Oral PRN    dextrose (D50W) injection syrg 12.5-25 g  12.5-25 g IntraVENous PRN    glucagon (GLUCAGEN) injection 1 mg  1 mg IntraMUSCular PRN    insulin lispro (HUMALOG) injection   SubCUTAneous AC&HS         Carmen Clarke MD

## 2017-07-11 NOTE — PROGRESS NOTES
Bedside and Verbal shift change report given to Moses Garcias 70 (oncoming nurse) by Carmina Early RN (offgoing nurse). Report included the following information SBAR, Kardex, Intake/Output, MAR and Cardiac Rhythm Normal Sinus. Zone Phone for oncoming shift:       Shift Summary: Patient placed back on 1.5 L O2 after complaints of SOB and SATs at 85    LDAs               Peripheral IV 07/09/17 Right Forearm (Active)   Site Assessment Clean, dry, & intact 7/11/2017  3:34 AM   Phlebitis Assessment 0 7/11/2017  3:34 AM   Infiltration Assessment 0 7/11/2017  3:34 AM   Dressing Status Clean, dry, & intact 7/11/2017  3:34 AM   Dressing Type Tape;Transparent 7/11/2017  3:34 AM   Hub Color/Line Status Blue;Capped; End cap changed 7/11/2017  3:34 AM   Alcohol Cap Used Yes 7/11/2017  3:34 AM                        Intake & Output   Date 07/10/17 0700 - 07/11/17 0659 07/11/17 0700 - 07/12/17 0659   Shift 0700-1859 1900-0659 24 Hour Total 0700-1859 0494-2327 24 Hour Total   I  N  T  A  K  E   P.O. 240  240         P. O. 240  240       I.V.  (mL/kg/hr) 100  (0.1) 175 275         I.V.  25 25         Volume (cefTRIAXone (ROCEPHIN) 1 g in 0.9% sodium chloride (MBP/ADV) 50 mL)  50 50         Volume (doxycycline (VIBRAMYCIN) 100 mg in 0.9% sodium chloride (MBP/ADV) 100 mL) 100 100 200       Shift Total  (mL/kg) 340  (3.6) 175  (1.9) 515  (5.5)      O  U  T  P  U  T   Urine  (mL/kg/hr)            Urine Occurrence(s) 1 x  1 x       Stool            Stool Occurrence(s) 2 x  2 x       Shift Total  (mL/kg)          175 515      Weight (kg) 94.2 94.2 94.2 94.2 94.2 94.2      Last Bowel Movement Last Bowel Movement Date: 07/10/17   Glucose Checks [] N/A  [] AC/HS  [] Q6  Concerns:   Nutrition Active Orders   Diet    DIET NPO       Consults []PT  []OT  []Speech  []Case Management   Cardiac Monitoring []N/A []Yes Expires:

## 2017-07-11 NOTE — PROGRESS NOTES
Pt is a [de-identified] y/o male that was admitted on 7/7/17 due to Dx of Sepsis, B Lower Lobe Pneumonia, Diaherrea, Non STEMI, Chronic systolic CHF LVEF 06-79%, HTN, AFIB, s/p lightheadedness/fall, DMT2. Pt is a Full Code. 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 Pt lives alone in a one story home with 4 SHASHA. Pt has a son that lives in [place]. His DTR and Ex Wife live 2 hours away in [place]. CM reviewed demographic information with the Pt and Ex Wife in the room. Pt indicated that if needed Pt may go to stay with DTR/Ex wife in [place] when discharged from the hospital.  Pt has no DME, no experience with SNF or [facility] services. Pt saw his PCP Dr. Demetrice Quijano about 2-3 months ago. Pt's Pharmacy is [facility] for long term medications. And [facility] for Short Term Medications. Pt is having a Cardiac Cath today. Tentative DC plan is to discharge tomorrow if stable. CM will continue to monitor discharge plan. Care Management Interventions  PCP Verified by CM: Yes (Pt saw PCP about 2-3 months ago. )  Mode of Transport at Discharge: Other (see comment) (Family can transport home at discharge)  Transition of Care Consult (CM Consult): Discharge Planning  Physical Therapy Consult: Yes  Current Support Network: Lives Alone, Own Home, Family Lives Nearby (Pt lives alone in a one story home with 4 SHASHA.   Son lives nearby. )  Discharge Location  Discharge Placement:  (TBD)    Pascual [place], [facility]   Ext [phone]

## 2017-07-11 NOTE — PROGRESS NOTES
Initial Nutrition Assessment:    INTERVENTIONS/RECOMMENDATIONS:   · Consider cardiac, consistent carb diet when able  · May benefit from MVI since he does not eat a large variety of foods    ASSESSMENT:   Chart reviewed, medically noted for CAD, DM and PMH provided below. Nutrition referral triggered due to EN/TPN PTA however false trigger. Pt received nutrition support ~16 years ago. Pt denies recent wt loss. His usually daily food intake consist of Atkins meals, tomato and cheese sandwiches and goes out to eat once per week. Will monitor PO intake and need for nutrition supplements. Past Medical History:   Diagnosis Date    DJD (degenerative joint disease) 10/11/2011    Eczema 4/28/2014    Snow Medina MD . Derm     Elevated lipids 10/11/2011    History of prostate cancer 10/11/2011    prostatectomy ; then for recurrence, XRT    HTN (hypertension) 10/11/2011    Hypothyroidism 10/11/2011    Paroxysmal a-fib ([address]     RBBB (right bundle branch block)     Type II or unspecified type diabetes mellitus without mention of complication, not stated as uncontrolled 10/21/2013    2013. A1C 8.5 %    Urinary incontinence 10/11/2011       Diet Order: NPO  % Eaten:  Patient Vitals for the past 72 hrs:   % Diet Eaten   07/11/17 1209 0 %   07/10/17 1311 70 %     Pertinent Medications: [x]Reviewed []Other (NS, insulin, synthroid)  Pertinent Labs: [x]Reviewed []Other  Food Allergies: [x]NKFA  []Other   Last BM:  7/10 diarrhea    Skin:    [] Intact   [] Incision  [] Breakdown  [] Other:     Wt Readings from Last 30 Encounters:   07/11/17 96.6 kg (212 lb 14.4 oz)   07/06/17 92.1 kg (203 lb)   10/12/16 86.8 kg (191 lb 5.8 oz)   05/11/15 90.3 kg (199 lb)   09/18/14 91.6 kg (202 lb)   04/28/14 91.6 kg (202 lb)   11/25/13 90.7 kg (200 lb)   10/29/13 91.6 kg (202 lb)   10/21/13 92.1 kg (203 lb)   04/15/13 91.6 kg (202 lb)   10/15/12 88.9 kg (196 lb)   04/11/12 87.5 kg (193 lb)   10/11/11 88.9 kg (196 lb) Anthropometrics:   Height: 6' 2\" (188 cm) Weight: 96.6 kg (212 lb 14.4 oz)   IBW (%IBW):   ( ) UBW (%UBW):   (  %)   Last Weight Metrics:  Weight Loss Metrics 7/11/2017 7/6/2017 10/12/2016 5/11/2015 9/18/2014 4/28/2014 11/25/2013   Today's Wt 212 lb 14.4 oz 203 lb 191 lb 5.8 oz 199 lb 202 lb 202 lb 200 lb   BMI 27.33 kg/m2 26.06 kg/m2 25.25 kg/m2 25.9 kg/m2 26.29 kg/m2 26.29 kg/m2 26.03 kg/m2       BMI: Body mass index is 27.33 kg/(m^2). This BMI is indicative of:   []Underweight    []Normal    [x]Overweight    [] Obesity   [] Extreme Obesity (BMI>40)     Estimated Nutrition Needs (Based on):   2250 Kcals/day (MSJ: 1725 x 1.3) , 95 g (1 g/kg) Protein  Carbohydrate: At Least 130 g/day  Fluids: 2250 mL/day (1ml/kcal) or per primary team    NUTRITION DIAGNOSES:   Problem:   (no acute nutrition diagnosis at this time)      Etiology: related to       Signs/Symptoms: as evidenced by        NUTRITION INTERVENTIONS:                 Heart healthy, consistent carb diet     GOAL:   consume >75% of meals in 5-7 days    LEARNING NEEDS (Diet, Food/Nutrient-Drug Interaction):    [x] None Identified   [] Identified and Education Provided/Documented   [] Identified and Pt declined/was not appropriate     Cultureal, Confucianism, OR Ethnic Dietary Needs:    [x] None Identified   [] Identified and Addressed     [x] Interdisciplinary Care Plan Reviewed/Documented    [x] Discharge Planning:   Heart healthy, consistent carb diet    MONITORING /EVALUATION:      Food/Nutrient Intake Outcomes:  Total energy intake  Physical Signs/Symptoms Outcomes: Weight/weight change, Electrolyte and renal profile, GI profile, Glucose profile, GI    NUTRITION RISK:    [] High              [] Moderate           [x]  Low  []  Minimal/Uncompromised    PT SEEN FOR:    []  MD Consult: []Calorie Count      []Diabetic Diet Education        []Diet Education     []Electrolyte Management     []General Nutrition Management and Supplements     []Management of Tube Feeding     []TPN Recommendations    [x]  RN Referral:  []MST score >=2     [x]Enteral/Parenteral Nutrition PTA     []Pregnant: Gestational DM or Multigestation     []Pressure Ulcer/Wound Care needs        []  Low BMI  []  DTR Referral       Marylee Minister, RDN  Pager [phone]  Weekend Pager [phone]

## 2017-07-11 NOTE — PROGRESS NOTES
TSBAR, Kardex, MAR, Recent Results and Cardiac Rhythm TRANSFER - IN REPORT:    Verbal report received from ALLISON Gonzales(name) on Magalie Jonesx  being received from renal(unit) for routine progression of care      Report consisted of patients Situation, Background, Assessment and   Recommendations(SBAR). Information from the following report(NSRSBAR, Kardex, MAR, Recent Results and Cardiac Rhythm NSR was reviewed with the receiving nurse. Opportunity for questions and clarification was provided. Assessment completed upon patients arrival to unit and care assumed. Pt arrived from cath lab alert/denies pain/NSR. 3 cc removed from TR band for patent hemostasis. TR currently at 12 cc. No bleeding or hematoma noted. EKG obtained. Pt tolerating PO and voiding. Bedside shift change report given to Boone Brittle, RN (oncoming nurse) by Jossue Brito RN (offgoing nurse). Report included the following information SBAR, Kardex, Intake/Output, MAR, Recent Results and Cardiac Rhythm NSR. TR band intact at 12 cc.

## 2017-07-11 NOTE — PROGRESS NOTES
Visited by Timbo Rose Partner 7/10/17.     Lolis Beebe, Lead [facility] Paging Service  [phone]

## 2017-07-12 ENCOUNTER — APPOINTMENT (OUTPATIENT)
Dept: GENERAL RADIOLOGY | Age: 82
DRG: 853 | End: 2017-07-12
Attending: INTERNAL MEDICINE
Payer: MEDICARE

## 2017-07-12 LAB
ANION GAP BLD CALC-SCNC: 9 MMOL/L (ref 5–15)
ATRIAL RATE: 84 BPM
BASOPHILS # BLD AUTO: 0.1 K/UL (ref 0–0.1)
BASOPHILS # BLD: 1 % (ref 0–1)
BNP SERPL-MCNC: 906 PG/ML (ref 0–100)
BUN SERPL-MCNC: 13 MG/DL (ref 6–20)
BUN/CREAT SERPL: 13 (ref 12–20)
CALCIUM SERPL-MCNC: 8.4 MG/DL (ref 8.5–10.1)
CALCULATED P AXIS, ECG09: 44 DEGREES
CALCULATED R AXIS, ECG10: -118 DEGREES
CALCULATED T AXIS, ECG11: -17 DEGREES
CHLORIDE SERPL-SCNC: 107 MMOL/L (ref 97–108)
CO2 SERPL-SCNC: 23 MMOL/L (ref 21–32)
CREAT SERPL-MCNC: 1 MG/DL (ref 0.7–1.3)
DIAGNOSIS, 93000: NORMAL
DIFFERENTIAL METHOD BLD: ABNORMAL
EOSINOPHIL # BLD: 1.8 K/UL (ref 0–0.4)
EOSINOPHIL NFR BLD: 12 % (ref 0–7)
ERYTHROCYTE [DISTWIDTH] IN BLOOD BY AUTOMATED COUNT: 13.9 % (ref 11.5–14.5)
GLUCOSE BLD STRIP.AUTO-MCNC: 106 MG/DL (ref 65–100)
GLUCOSE BLD STRIP.AUTO-MCNC: 115 MG/DL (ref 65–100)
GLUCOSE BLD STRIP.AUTO-MCNC: 120 MG/DL (ref 65–100)
GLUCOSE BLD STRIP.AUTO-MCNC: 121 MG/DL (ref 65–100)
GLUCOSE BLD STRIP.AUTO-MCNC: 169 MG/DL (ref 65–100)
GLUCOSE SERPL-MCNC: 113 MG/DL (ref 65–100)
HCT VFR BLD AUTO: 34.7 % (ref 36.6–50.3)
HGB BLD-MCNC: 12.1 G/DL (ref 12.1–17)
LYMPHOCYTES # BLD AUTO: 27 % (ref 12–49)
LYMPHOCYTES # BLD: 3.9 K/UL (ref 0.8–3.5)
MCH RBC QN AUTO: 33.7 PG (ref 26–34)
MCHC RBC AUTO-ENTMCNC: 34.9 G/DL (ref 30–36.5)
MCV RBC AUTO: 96.7 FL (ref 80–99)
MONOCYTES # BLD: 1.3 K/UL (ref 0–1)
MONOCYTES NFR BLD AUTO: 9 % (ref 5–13)
NEUTS SEG # BLD: 7.5 K/UL (ref 1.8–8)
NEUTS SEG NFR BLD AUTO: 51 % (ref 32–75)
P-R INTERVAL, ECG05: 180 MS
PLATELET # BLD AUTO: 112 K/UL (ref 150–400)
POTASSIUM SERPL-SCNC: 3.6 MMOL/L (ref 3.5–5.1)
Q-T INTERVAL, ECG07: 404 MS
QRS DURATION, ECG06: 142 MS
QTC CALCULATION (BEZET), ECG08: 477 MS
RBC # BLD AUTO: 3.59 M/UL (ref 4.1–5.7)
RBC MORPH BLD: ABNORMAL
SERVICE CMNT-IMP: ABNORMAL
SODIUM SERPL-SCNC: 139 MMOL/L (ref 136–145)
VENTRICULAR RATE, ECG03: 84 BPM
WBC # BLD AUTO: 14.6 K/UL (ref 4.1–11.1)
WBC MORPH BLD: ABNORMAL

## 2017-07-12 PROCEDURE — 36415 COLL VENOUS BLD VENIPUNCTURE: CPT | Performed by: INTERNAL MEDICINE

## 2017-07-12 PROCEDURE — 77010033678 HC OXYGEN DAILY

## 2017-07-12 PROCEDURE — 83880 ASSAY OF NATRIURETIC PEPTIDE: CPT | Performed by: INTERNAL MEDICINE

## 2017-07-12 PROCEDURE — 74011250637 HC RX REV CODE- 250/637: Performed by: INTERNAL MEDICINE

## 2017-07-12 PROCEDURE — 65660000000 HC RM CCU STEPDOWN

## 2017-07-12 PROCEDURE — 74011250636 HC RX REV CODE- 250/636: Performed by: INTERNAL MEDICINE

## 2017-07-12 PROCEDURE — 74011250636 HC RX REV CODE- 250/636: Performed by: FAMILY MEDICINE

## 2017-07-12 PROCEDURE — 77030010547 HC BG URIN W/UMETER -A

## 2017-07-12 PROCEDURE — 74011000258 HC RX REV CODE- 258: Performed by: FAMILY MEDICINE

## 2017-07-12 PROCEDURE — 74011000250 HC RX REV CODE- 250: Performed by: FAMILY MEDICINE

## 2017-07-12 PROCEDURE — 94640 AIRWAY INHALATION TREATMENT: CPT

## 2017-07-12 PROCEDURE — 74011000250 HC RX REV CODE- 250: Performed by: INTERNAL MEDICINE

## 2017-07-12 PROCEDURE — 85025 COMPLETE CBC W/AUTO DIFF WBC: CPT | Performed by: INTERNAL MEDICINE

## 2017-07-12 PROCEDURE — 74011250637 HC RX REV CODE- 250/637: Performed by: FAMILY MEDICINE

## 2017-07-12 PROCEDURE — 74011636637 HC RX REV CODE- 636/637: Performed by: FAMILY MEDICINE

## 2017-07-12 PROCEDURE — 71020 XR CHEST PA LAT: CPT

## 2017-07-12 PROCEDURE — 80048 BASIC METABOLIC PNL TOTAL CA: CPT | Performed by: INTERNAL MEDICINE

## 2017-07-12 PROCEDURE — 74011000258 HC RX REV CODE- 258: Performed by: INTERNAL MEDICINE

## 2017-07-12 PROCEDURE — 77030010545

## 2017-07-12 PROCEDURE — 97116 GAIT TRAINING THERAPY: CPT | Performed by: PHYSICAL THERAPIST

## 2017-07-12 RX ORDER — AMIODARONE HYDROCHLORIDE 200 MG/1
200 TABLET ORAL 2 TIMES DAILY
Status: DISCONTINUED | OUTPATIENT
Start: 2017-07-12 | End: 2017-07-13

## 2017-07-12 RX ORDER — POTASSIUM CHLORIDE 750 MG/1
10 TABLET, FILM COATED, EXTENDED RELEASE ORAL DAILY
Status: DISCONTINUED | OUTPATIENT
Start: 2017-07-13 | End: 2017-07-14 | Stop reason: HOSPADM

## 2017-07-12 RX ORDER — FUROSEMIDE 10 MG/ML
20 INJECTION INTRAMUSCULAR; INTRAVENOUS ONCE
Status: COMPLETED | OUTPATIENT
Start: 2017-07-12 | End: 2017-07-12

## 2017-07-12 RX ORDER — FUROSEMIDE 10 MG/ML
20 INJECTION INTRAMUSCULAR; INTRAVENOUS
Status: DISCONTINUED | OUTPATIENT
Start: 2017-07-12 | End: 2017-07-13

## 2017-07-12 RX ORDER — PRAVASTATIN SODIUM 10 MG/1
20 TABLET ORAL DAILY
Status: DISCONTINUED | OUTPATIENT
Start: 2017-07-12 | End: 2017-07-14 | Stop reason: HOSPADM

## 2017-07-12 RX ADMIN — METOPROLOL SUCCINATE 25 MG: 25 TABLET, EXTENDED RELEASE ORAL at 09:54

## 2017-07-12 RX ADMIN — APIXABAN 2.5 MG: 2.5 TABLET, FILM COATED ORAL at 17:22

## 2017-07-12 RX ADMIN — ASPIRIN 81 MG CHEWABLE TABLET 81 MG: 81 TABLET CHEWABLE at 09:54

## 2017-07-12 RX ADMIN — ALBUTEROL SULFATE 2.5 MG: 2.5 SOLUTION RESPIRATORY (INHALATION) at 03:40

## 2017-07-12 RX ADMIN — LEVOTHYROXINE SODIUM 25 MCG: 25 TABLET ORAL at 09:54

## 2017-07-12 RX ADMIN — FUROSEMIDE 20 MG: 10 INJECTION, SOLUTION INTRAMUSCULAR; INTRAVENOUS at 17:22

## 2017-07-12 RX ADMIN — Medication 10 ML: at 22:40

## 2017-07-12 RX ADMIN — PRAVASTATIN SODIUM 20 MG: 10 TABLET ORAL at 09:54

## 2017-07-12 RX ADMIN — ALBUTEROL SULFATE 2.5 MG: 2.5 SOLUTION RESPIRATORY (INHALATION) at 19:10

## 2017-07-12 RX ADMIN — FUROSEMIDE 20 MG: 10 INJECTION, SOLUTION INTRAMUSCULAR; INTRAVENOUS at 03:32

## 2017-07-12 RX ADMIN — FUROSEMIDE 20 MG: 10 INJECTION, SOLUTION INTRAMUSCULAR; INTRAVENOUS at 09:00

## 2017-07-12 RX ADMIN — DOXYCYCLINE 100 MG: 100 INJECTION, POWDER, LYOPHILIZED, FOR SOLUTION INTRAVENOUS at 12:37

## 2017-07-12 RX ADMIN — ALBUTEROL SULFATE 2.5 MG: 2.5 SOLUTION RESPIRATORY (INHALATION) at 08:20

## 2017-07-12 RX ADMIN — CEFTRIAXONE 1 G: 1 INJECTION, POWDER, FOR SOLUTION INTRAMUSCULAR; INTRAVENOUS at 22:39

## 2017-07-12 RX ADMIN — CLOPIDOGREL BISULFATE 75 MG: 75 TABLET ORAL at 09:54

## 2017-07-12 RX ADMIN — Medication 10 ML: at 03:32

## 2017-07-12 RX ADMIN — INSULIN LISPRO 2 UNITS: 100 INJECTION, SOLUTION INTRAVENOUS; SUBCUTANEOUS at 12:37

## 2017-07-12 RX ADMIN — FUROSEMIDE 20 MG: 10 INJECTION, SOLUTION INTRAMUSCULAR; INTRAVENOUS at 09:55

## 2017-07-12 RX ADMIN — ALBUTEROL SULFATE 2.5 MG: 2.5 SOLUTION RESPIRATORY (INHALATION) at 14:03

## 2017-07-12 RX ADMIN — AMIODARONE HYDROCHLORIDE 200 MG: 200 TABLET ORAL at 17:22

## 2017-07-12 RX ADMIN — AMIODARONE HYDROCHLORIDE 200 MG: 200 TABLET ORAL at 09:54

## 2017-07-12 RX ADMIN — Medication 10 ML: at 23:23

## 2017-07-12 RX ADMIN — DOXYCYCLINE 100 MG: 100 INJECTION, POWDER, LYOPHILIZED, FOR SOLUTION INTRAVENOUS at 23:23

## 2017-07-12 NOTE — PROGRESS NOTES
Hospitalist Progress Note    NAME: Yoel Major   :  1935   MRN:  [id number]     Admit date: 2017    Today's date: 17    PCP: Nader Orourke. Milind Coleman DO          Assessment / Plan:    Severe sepsis POA WBC 22,200, HR 93, temp 102.7, lactate 2.3 .   Possible bilateral lower lobe pneumonia POA  Diarrhea POA  Presents with nonspecific weakness, increasing SOB, mild cough  CT chest with subtle ASD at the [facility] remain negative  UA negative for UA  CT scan abdomen/pelvis negative for colitis or intraabdominal source  stool studies unremarkable  IV ceftriaxone and doxycycline(stop azithromycin on the flecainide) day 6      Change to cefuroxime/doxycycline to complete 7-10 days total at discharge  -repeat CXR since WBC increasing    Non ST elevation MI POA peak troponin 5.59  Acute on chronic systolic CHF LVEF 35 (possible POA)  CAD with 80% mid LAD lesion s/p BMS PCI on afternoon of   Essential HTN POA  Chronic atrial fibrillation POA  Eliquis, Plavix  Lopressor  Continue flecainide  Hold cardura, captopril with borderline BP  D/W Dr Jcarlos Ramirez  No PE on CTA  Increase Lasix to BID dosing; monitor BP and renal function closely    Lightheadedness/fall POA  [place] CT scan negative   PT/OT consults    Diabetes type 2 POA HgBa1c 5.6  SSI      Code status: Full  Prophylaxis: Lovenox  Recommended Disposition: Home w/Family     Subjective:     Chief Complaint / Reason for Physician Visit f/u severe sepsis  Patient SOB last pm  SOB better today  Patient did well with therapy and feels like he is improving  WBC increasing      Review of Systems:  Symptom Y/N Comments  Symptom Y/N Comments   Fever/Chills    Chest Pain n    Poor Appetite    Edema     Cough y   Abdominal Pain n    Sputum    Joint Pain     SOB/STILL    Pruritis/Rash     Nausea/vomit n   Tolerating PT/OT     Diarrhea    Tolerating Diet Yes Denies dysphagia   Constipation    Other       Could NOT obtain due to:      Objective: VITALS:   Last 24hrs VS reviewed since prior progress note. Most recent are:  Patient Vitals for the past 24 hrs:   Temp Pulse Resp BP SpO2   07/12/17 1810 98.4 °F (36.9 °C) 90 17 104/65 -   07/12/17 1403 - - - - 94 %   07/12/17 1203 98.6 °F (37 °C) 97 18 98/59 93 %   07/12/17 1202 - 99 - 103/64 -   07/12/17 1201 - (!) 108 - - 97 %   07/12/17 1100 - 97 - 100/63 97 %   07/12/17 0820 - - - - 97 %   07/12/17 0340 - - - - 97 %   07/12/17 0320 98.7 °F (37.1 °C) 85 20 138/80 99 %   07/12/17 0001 98.1 °F (36.7 °C) 85 16 141/81 99 %   07/11/17 2230 - 84 - 123/80 98 %   07/11/17 2200 - 85 - 123/70 97 %   07/11/17 2130 - 79 - 111/67 99 %   07/11/17 2115 - 84 - 112/65 99 %   07/11/17 2114 - - - - 98 %   07/11/17 2100 - - - (!) 119/94 -   07/11/17 2045 - 79 - 134/58 97 %   07/11/17 2030 - 76 - 113/68 98 %   07/11/17 2025 - 76 - 118/69 98 %   07/11/17 2020 - 74 - 115/74 99 %   07/11/17 2015 - 75 - 117/67 99 %   07/11/17 2010 - 77 - 114/69 99 %   07/11/17 2005 - 74 - 111/69 98 %   07/11/17 2000 - 78 - 115/67 99 %   07/11/17 1955 - 77 - 112/67 99 %   07/11/17 1950 - 81 - 117/71 98 %   07/11/17 1945 - 85 - 124/71 99 %   07/11/17 1930 - 84 - 120/69 98 %   07/11/17 1915 - 84 - 118/72 98 %   07/11/17 1900 - - - 126/77 -   07/11/17 1848 - 90 - 137/77 96 %       Intake/Output Summary (Last 24 hours) at 07/12/17 1847  Last data filed at 07/12/17 0708   Gross per 24 hour   Intake                0 ml   Output             1150 ml   Net            -1150 ml        Wt Readings from Last 12 Encounters:   07/12/17 90.4 kg (199 lb 4.7 oz)   07/06/17 92.1 kg (203 lb)   10/12/16 86.8 kg (191 lb 5.8 oz)   05/11/15 90.3 kg (199 lb)   09/18/14 91.6 kg (202 lb)   04/28/14 91.6 kg (202 lb)   11/25/13 90.7 kg (200 lb)   10/29/13 91.6 kg (202 lb)   10/21/13 92.1 kg (203 lb)   04/15/13 91.6 kg (202 lb)   10/15/12 88.9 kg (196 lb)   04/11/12 87.5 kg (193 lb)       PHYSICAL EXAM:  General: WD, WN. Alert, cooperative, no acute distress    EENT:  PERRL. Anicteric sclerae. MMM  Resp:  Crackles at bases bilaterally (R>L), no wheezing. No accessory muscle use  CV:  Regular  rhythm,  No edema  GI:  Soft, mildly distended, Non tender.  +Bowel sounds, no rebound  Neurologic:  Alert and oriented X 3, normal speech, non focal motor exam  Psych:   Good insight. Not anxious nor agitated  MS:  No joint swelling or erythema  Skin:  No rashes. No jaundice    Reviewed most current lab test results and cultures  YES  Reviewed most current radiology test results   YES  Review and summation of old records today    NO  Reviewed patient's current orders and MAR    YES  PMH/SH reviewed - no change compared to H&P  ________________________________________________________________________  Care Plan discussed with:    Comments   Patient x    Family  x Ex-wife   RN     Care Manager     Consultant                        Multidiciplinary team rounds were held today with , nursing, pharmacist and clinical coordinator. Patient's plan of care was discussed; medications were reviewed and discharge planning was addressed. ________________________________________________________________________  Total NON critical care TIME: 25   Minutes    Total CRITICAL CARE TIME Spent:   Minutes non procedure based      Comments   >50% of visit spent in counseling and coordination of care     ________________________________________________________________________  Twan Arciniega MD     Procedures: see electronic medical records for all procedures/Xrays and details which were not copied into this note but were reviewed prior to creation of Plan. LABS:  I reviewed today's most current labs and imaging studies.   Pertinent labs include:  Recent Labs      07/12/17 0336  07/11/17 0339  07/10/17   0325   WBC  14.6*  12.7*  11.9*   HGB  12.1  11.8*  11.4*   HCT  34.7*  34.6*  33.2*   PLT  112*  115*  115*     Recent Labs      07/12/17   0336  07/11/17   0339  07/10/17   0325   NA  139 139  140   K  3.6  3.5  3.7   CL  107  108  110*   CO2  23  23  22   GLU  113*  101*  94   BUN  13  12  16   CREA  1.00  1.06  1.11   CA  8.4*  8.6  8.2*

## 2017-07-12 NOTE — PROCEDURES
Kip Shelley, [address]   CORONARY ANGIOPLASTY       Name:  Julien Medrano   MR#:  [id number]   :  1935   Account #:  [de-identified]        Date of Adm:  2017       ESTIMATED BLOOD LOSS: Less than 50 mL    SPECIMENS REMOVED: None. ANESTHESIA:  Sedation. CINE NUMBER:     PROCEDURE PERFORMED:     1. Left heart cardiac catheterization. 2. Left ventricular angiography. 3. Selective coronary angiography. 4. Percutaneous transluminal coronary angioplasty and stenting of the   mid left anterior descending with a drug-eluting stent. 5. Sedation. Sedation was administered by cath lab staff, and I supervised them as   they monitored the patient for the duration of the procedure. DURATION: 47 minutes. SEDATION: Versed and Fentanyl. INDICATIONS: Non-ST-elevation myocardial infarction. COMPLICATIONS: None. TECHNIQUE: 5-Mauritanian right radial artery. RESULTS:    1. LEFT HEART CARDIAC CATHETERIZATION: Arterial pressure of   128/83 with a mean of 100. Left ventricular pressure of 128/25. COMMENTS: Left ventricular end-diastolic pressure is moderately   elevated. There is no aortic stenosis. Systemic arterial pressures are   within normal limits. 2. LEFT VENTRICULAR ANGIOGRAPHY IN THE SHANKAR   PROJECTION: The left ventricle is normal in size with   moderately decreased left ventricular function with an estimated   ejection fraction of 35%. There is distal anterior apical and distal inferior   wall hypokinesis. 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 There is no significant mitral regurgitation. 3. SELECTIVE CORONARY ANGIOGRAPHY:     a. LEFT MAIN: The left main is normal caliber with mild luminal   irregularities but no significant obstruction.     b. LEFT ANTERIOR DESCENDING: The left anterior descending is a   large caliber vessel, gives rise to a moderate sized diagonal branch and wraps around the apex and supplied the inferior wall. The mid   portion of the left anterior descending has an 80% stenosis and   appears to match the wall motion abnormality. c. LEFT CIRCUMFLEX: The left circumflex is a moderate caliber   vessel and gives rise to moderate obtuse marginal. The left circumflex   and obtuse marginal have diffuse mild luminal irregularities. d. RIGHT CORONARY ARTERY: The right coronary artery is   dominant. The right coronary artery is a moderate caliber vessel. It   gives rise to a small RPDA and a small right posterolateral system. The   right coronary artery and its branches have diffuse mild luminal   irregularities. 4. PTCA AND STENTING OF THE LEFT ANTERIOR DESCENDING:   The patient was anticoagulated with intravenous bivalirudin and   received 600 mg of Plavix. A 6-Latvian EBU 3.5 guiding catheter was   advanced to the ostium of the left main. A 0.014 inch Runthrough was   advanced to the distal vessel. The lesion was predilated with a 2.5 mm   balloon. The lesion was then stented with a 3.5 x 23 Xience Alpine   drug-eluting stent. At this point, there was a 0% residual stenosis with   prompt PIYUSH-3 opacification of the distal vessel. At the completion of the procedure, hemostasis was obtained via a TR   Band. CONCLUSION:    1. Moderately decreased left ventricular function with an estimated   ejection fraction of 35% with an 80% mid left anterior descending   stenosis in a large left anterior descending that wrapped around the   apex, successfully treated with a drug-eluting stent. 2. Moderately elevated left ventricular filling pressures. No aortic   stenosis. Systemic arterial pressures within normal limits. 3. Moderately decreased left ventricular function with an estimated   ejection fraction of 35% with distal anterior apical and distal inferior   hypokinesis and no significant mitral regurgitation.     4. An 80% mid left anterior descending stenosis in a right-dominant   system. The left anterior descending is a large vessel that wraps   around the apex and appears to supply the territory that is hypokinetic. 5. Successful percutaneous transluminal coronary angioplasty and   stenting of the mid left anterior descending from 80% to 0% utilizing a   3.5 x 23 Xience Alpine drug-eluting stent. Initial PIYUSH flow was 3. Final PIYUSH flow of 3.     6. The patient will continue aspirin therapy indefinitely, Plavix for   a minimum of 1 year. Bivalirudin was used during the time of the   procedure.               Dennis Shankar MD      SR / CM   D:  07/11/2017   16:37   T:  07/12/2017   13:37   Job #:  [id number]

## 2017-07-12 NOTE — PROGRESS NOTES
Patient reports that he is on a \"chemo shot\" since September. Unable to provide name of medication. This RN currently in first trimester of pregnancy. Patient assignment changed per nursing policy. Bedside and Verbal shift change report given to Antonio Cain RN (oncoming nurse) by Ron Solano (offgoing nurse).  Report included the following information SBAR, MAR and Cardiac Rhythm SR.

## 2017-07-12 NOTE — PROGRESS NOTES
Problem: Mobility Impaired (Adult and Pediatric)  Goal: *Acute Goals and Plan of Care (Insert Text)  Physical Therapy Goals  Initiated 7/8/2017  1. Patient will move from supine to sit and sit to supine in bed with independence within 7 day(s). 2. Patient will transfer from bed to chair and chair to bed with modified independence using the least restrictive device within 7 day(s). 3. Patient will perform sit to stand with modified independence within 7 day(s). 4. Patient will ambulate with modified independence for 250 feet with the least restrictive device within 7 day(s). 5. Patient will ascend/descend 4 stairs with 1 handrail(s) with independence within 7 day(s). PHYSICAL THERAPY TREATMENT  SEEN [address] [facility]        Patient: Mian Gibson (46 y.o. male)  Date: 7/12/2017   Diagnosis: Pneumonia <principal problem not specified>  Precautions: Standard; Falls      ASSESSMENT:  Patient seen for PT session this am.  Cleared by nurse to see. Patient up sitting in the chair upon arrival.  Wife in room and very supportive. Patient willing to work with PT. Stood to "[employer]" without difficulty. Stated that he had a problem with incontinence and requested a brief to walk in the craft. Got brief and applied. He was able to ambulate 250' with RW and SBA. Went up/down 5 steps with use of one rail and CGA. Back to room and up in chair again. Making nice progress with mobility. Progression toward goals:  [X]    Improving appropriately and progressing toward goals  [ ]    Improving slowly and progressing toward goals  [ ]    Not making progress toward goals and plan of care will be adjusted       PLAN:  Patient continues to benefit from skilled intervention to address the above impairments. Continue treatment per established plan of care.   Discharge Recommendations:  Home Health  Further Equipment Recommendations for Discharge:  rolling walker       SUBJECTIVE:   Patient stated [place] be going to [place] shortly after I get discharged from here.       OBJECTIVE DATA SUMMARY:   Critical Behavior:  Neurologic State: Alert  Orientation Level: Oriented X4  Cognition: Appropriate decision making, Appropriate for age attention/concentration, Appropriate safety awareness  Safety/Judgement: Awareness of environment, Good awareness of safety precautions, Fall prevention, Insight into deficits      Functional Mobility Training:  Transfers:  Sit to Stand: Supervision  Stand to Sit: Supervision     Balance:  Sitting: Intact  Standing: Intact; With support  Standing - Static: Constant support;Good  Standing - Dynamic : Good (with RW)     Ambulation/Gait Training:  Distance (ft): 250 Feet (ft)  Assistive Device: Gait belt;Walker, rolling  Ambulation - Level of Assistance: Stand-by asssistance  Gait Abnormalities: Decreased step clearance  Base of Support: Narrowed  Speed/Muriel: Pace decreased (<100 feet/min)     Stairs:  Number of Stairs Trained: 5  Stairs - Level of Assistance: Contact guard assistance              Rail Use: Right  (going up and left coming down)      Pain:  Pain Scale 1: Numeric (0 - 10)  Pain Intensity 1: 0     Activity Tolerance: Tolerated PT session well. Please refer to the flowsheet for vital signs taken during this treatment.   After treatment:   [X]    Patient left in no apparent distress sitting up in chair  [ ]    Patient left in no apparent distress in bed  [X]    Call bell left within reach  [X]    Nursing notified  [X]    Caregiver present  [ ]    Bed alarm activated (not being used)      COMMUNICATION/COLLABORATION:   The patients plan of care was discussed with: Registered Nurse     Angus Gomez, PT  Time Calculation: 24 mins

## 2017-07-12 NOTE — PROGRESS NOTES
Progress Note      7/12/2017 11:38 AM  NAME: Amy William   MRN:  [id number]   Admit Diagnosis: Pneumonia                          Assessment:                 1. Dyspnea, syncope, febrile, ?pneumonia, leukoctyosis, increased troponin suggestive of NSTEMI  2. Stress in 2010 negative  3. Echo 4/2017 EF 60% Echo 7/2017 EF 35%  4. Prox Afib/aflutter with RBBB/lAHB on flecainide, dig stopped in past due to bradycardia  5. DM  6. HTN  7. Dyslipidemia  8. 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 Otto Cough  9. DANIELA in 2015 ok  10. Hypothyroid  11. Prostate CA  12. , very mild functional capacity  13. Cardiologist:  Dr. Azael Jennings                            Plan:                  Unusual presentation. Dyspnea, mild cough, not productive, CT suggest pneumonia, febrile, leukocytosis, on Abx. Breathing improving. No chest pain, no acute ECG changes, but increased troponin. No edema, does not appear to be in CHF. Sinus so far, hx of bradycardia     No PE or clear source of infection on CT. Echo raises possiblity of Takatsubo's  Cath with PCI of LAD.     1. Resume eliquis at lower dose 2.5mg bid tonight  2. Cont Added ASA for one month  3. Since now with CAD and CHF change flecainide to amiodarone 200mg po bid, discharge on daily  4. Cont Added low dose metoprolol 25mg  5. Hold captropril for now  6. Diurese with iV lasix, additional dose today  7. Add pravastatin 20    .       [x]        High complexity decision making was performed         Subjective:     Amy William denies chest pain, dyspnea improving. Discussed with RN events overnight.      Review of Systems:    Symptom Y/N Comments  Symptom Y/N Comments   Fever/Chills N   Chest Pain N    Poor Appetite N   Edema N    Cough N   Abdominal Pain N    Sputum N   Joint Pain N    SOB/STILL N   Pruritis/Rash N    Nausea/vomit N   Tolerating PT/OT Y    Diarrhea N   Tolerating Diet Y    Constipation N   Other       Could NOT obtain due to:      Objective:      Physical Exam:    Last 24hrs VS reviewed since prior progress note. Most recent are:    Visit Vitals    /80    Pulse 85    Temp 98.7 °F (37.1 °C)    Resp 20    Ht 6' 2\" (1.88 m)    Wt 90.4 kg (199 lb 4.7 oz)    SpO2 97%    BMI 25.59 kg/m2       Intake/Output Summary (Last 24 hours) at 07/12/17 0817  Last data filed at 07/12/17 7330   Gross per 24 hour   Intake              100 ml   Output             2250 ml   Net            -2150 ml        General Appearance: Well developed, well nourished, alert & oriented x 3,    no acute distress. Ears/Nose/Mouth/Throat: Hearing grossly normal.  Neck: Supple. Chest: Lungs clear to auscultation bilaterally. Cardiovascular: Regular rate and rhythm, S1S2 normal, no murmur. Abdomen: Soft, non-tender, bowel sounds are active. Extremities: No edema bilaterally. Skin: Warm and dry. PMH/SH reviewed - no change compared to H&P    Data Review    Telemetry: normal sinus rhythm     Lab Data Personally Reviewed:    Recent Labs      07/12/17   0336  07/11/17   0339   WBC  14.6*  12.7*   HGB  12.1  11.8*   HCT  34.7*  34.6*   PLT  112*  115*     No results for input(s): INR, PTP, APTT in the last 72 hours. No lab exists for component: Valery Allen   Recent Labs      07/12/17   0336  07/11/17   0339  07/10/17   0325   NA  139  139  140   K  3.6  3.5  3.7   CL  107  108  110*   CO2  23  23  22   BUN  13  12  16   CREA  1.00  1.06  1.11   GLU  113*  101*  94   CA  8.4*  8.6  8.2*     No results for input(s): CPK, CKNDX, TROIQ in the last 72 hours. No lab exists for component: CPKMB  Lab Results   Component Value Date/Time    Cholesterol, total 118 07/09/2017 05:41 AM    HDL Cholesterol 31 07/09/2017 05:41 AM    LDL, calculated 70.2 07/09/2017 05:41 AM    Triglyceride 84 07/09/2017 05:41 AM    CHOL/HDL Ratio 3.8 07/09/2017 05:41 AM       No results for input(s): SGOT, GPT, AP, TBIL, TP, ALB, GLOB, GGT, AML, LPSE in the last 72 hours.     No lab exists for component: AMYP, HLPSE  No results for input(s): PH, PCO2, PO2 in the last 72 hours.     Medications Personally Reviewed:    Current Facility-Administered Medications   Medication Dose Route Frequency    amiodarone (CORDARONE) tablet 200 mg  200 mg Oral BID    apixaban (ELIQUIS) tablet 2.5 mg  2.5 mg Oral BID    furosemide (LASIX) injection 20 mg  20 mg IntraVENous ONCE    ADDaptor        furosemide (LASIX) injection 20 mg  20 mg IntraVENous DAILY    nitroglycerin (NITROBID) 2 % ointment 1 Inch  1 Inch Topical Q6H    sodium chloride (NS) flush 5-10 mL  5-10 mL IntraVENous Q8H    sodium chloride (NS) flush 5-10 mL  5-10 mL IntraVENous PRN    naloxone (NARCAN) injection 0.4 mg  0.4 mg IntraVENous PRN    clopidogrel (PLAVIX) tablet 75 mg  75 mg Oral DAILY    albuterol (PROVENTIL VENTOLIN) nebulizer solution 2.5 mg  2.5 mg Nebulization Q6HWA RT    albuterol (PROVENTIL VENTOLIN) nebulizer solution 2.5 mg  2.5 mg Nebulization Q4H PRN    metoprolol succinate (TOPROL-XL) XL tablet 25 mg  25 mg Oral DAILY    loperamide (IMODIUM) capsule 2 mg  2 mg Oral Q4H PRN    ondansetron (ZOFRAN) injection 4 mg  4 mg IntraVENous Q6H PRN    doxycycline (VIBRAMYCIN) 100 mg in 0.9% sodium chloride (MBP/ADV) 100 mL  100 mg IntraVENous Q12H    sodium chloride (NS) flush 5-10 mL  5-10 mL IntraVENous PRN    levothyroxine (SYNTHROID) tablet 25 mcg  25 mcg Oral ACB    sodium chloride (NS) flush 5-10 mL  5-10 mL IntraVENous Q8H    sodium chloride (NS) flush 5-10 mL  5-10 mL IntraVENous PRN    cefTRIAXone (ROCEPHIN) 1 g in 0.9% sodium chloride (MBP/ADV) 50 mL  1 g IntraVENous Q24H    acetaminophen (TYLENOL) tablet 650 mg  650 mg Oral Q4H PRN    aspirin chewable tablet 81 mg  81 mg Oral DAILY    glucose chewable tablet 16 g  4 Tab Oral PRN    dextrose (D50W) injection syrg 12.5-25 g  12.5-25 g IntraVENous PRN    glucagon (GLUCAGEN) injection 1 mg  1 mg IntraMUSCular PRN    insulin lispro (HUMALOG) injection   SubCUTAneous AC&HS         Nevaeh Salomon MD

## 2017-07-12 NOTE — CARDIO/PULMONARY
C/P rehab note- chart reviewed. S/P Cardiac cath 7/11/2017. DX CAD. LVEF 35%. PCI/stenting intervention. Former smoker. HX includes HTN, A-Fib,DMT2. Met with pt and sig other. States he will be spending a lot of time in [place] after he leaves here and cannot commit to OP Cardiac Rehab. Suggested he speak with his cardiologist and find out when and where he will be following up. Reminded to start walking program if able at least 3 x week and keep HR up. Printed material given and discussed re: heart healthy habits, what to expect following coronary angioplasty, and post cardiac catheterization instructions. Discussed post catheterization restrictions, including:no manipulating the wrist for 24 hours, no lifting for 7 days, no soaking the wrist for 3 days . Also discussed what to do if bleeding or bruising at the cath insertion site is observed. Reviewed  the importance of medication compliance( Plavix), monitoring for any unusual signs & symptoms and when to call the doctor. Verbalized understanding.

## 2017-07-13 LAB
ALBUMIN SERPL BCP-MCNC: 2.3 G/DL (ref 3.5–5)
ALBUMIN/GLOB SERPL: 0.6 {RATIO} (ref 1.1–2.2)
ALP SERPL-CCNC: 118 U/L (ref 45–117)
ALT SERPL-CCNC: 77 U/L (ref 12–78)
ANION GAP BLD CALC-SCNC: 9 MMOL/L (ref 5–15)
AST SERPL W P-5'-P-CCNC: 33 U/L (ref 15–37)
BACTERIA SPEC CULT: NORMAL
BASOPHILS # BLD AUTO: 0 K/UL
BASOPHILS # BLD: 0 %
BILIRUB SERPL-MCNC: 0.5 MG/DL (ref 0.2–1)
BUN SERPL-MCNC: 21 MG/DL (ref 6–20)
BUN/CREAT SERPL: 16 (ref 12–20)
CALCIUM SERPL-MCNC: 8.6 MG/DL (ref 8.5–10.1)
CHLORIDE SERPL-SCNC: 107 MMOL/L (ref 97–108)
CO2 SERPL-SCNC: 26 MMOL/L (ref 21–32)
CREAT SERPL-MCNC: 1.34 MG/DL (ref 0.7–1.3)
EOSINOPHIL # BLD: 1.2 K/UL
EOSINOPHIL NFR BLD: 10 %
ERYTHROCYTE [DISTWIDTH] IN BLOOD BY AUTOMATED COUNT: 14 % (ref 11.5–14.5)
GLOBULIN SER CALC-MCNC: 3.7 G/DL (ref 2–4)
GLUCOSE BLD STRIP.AUTO-MCNC: 116 MG/DL (ref 65–100)
GLUCOSE BLD STRIP.AUTO-MCNC: 120 MG/DL (ref 65–100)
GLUCOSE BLD STRIP.AUTO-MCNC: 120 MG/DL (ref 65–100)
GLUCOSE BLD STRIP.AUTO-MCNC: 151 MG/DL (ref 65–100)
GLUCOSE SERPL-MCNC: 117 MG/DL (ref 65–100)
HCT VFR BLD AUTO: 34.2 % (ref 36.6–50.3)
HGB BLD-MCNC: 11.7 G/DL (ref 12.1–17)
LYMPHOCYTES # BLD AUTO: 28 %
LYMPHOCYTES # BLD: 3.4 K/UL
MCH RBC QN AUTO: 33.1 PG (ref 26–34)
MCHC RBC AUTO-ENTMCNC: 34.2 G/DL (ref 30–36.5)
MCV RBC AUTO: 96.9 FL (ref 80–99)
MONOCYTES # BLD: 0.7 K/UL
MONOCYTES NFR BLD AUTO: 6 %
MYELOCYTES NFR BLD MANUAL: 1 %
NEUTS BAND NFR BLD MANUAL: 1 %
NEUTS SEG # BLD: 6.7 K/UL
NEUTS SEG NFR BLD AUTO: 54 %
PLATELET # BLD AUTO: 110 K/UL (ref 150–400)
POTASSIUM SERPL-SCNC: 3.3 MMOL/L (ref 3.5–5.1)
PROT SERPL-MCNC: 6 G/DL (ref 6.4–8.2)
RBC # BLD AUTO: 3.53 M/UL (ref 4.1–5.7)
RBC MORPH BLD: ABNORMAL
SERVICE CMNT-IMP: ABNORMAL
SERVICE CMNT-IMP: NORMAL
SODIUM SERPL-SCNC: 142 MMOL/L (ref 136–145)
WBC # BLD AUTO: 12.1 K/UL (ref 4.1–11.1)
WBC MORPH BLD: ABNORMAL

## 2017-07-13 PROCEDURE — 74011000250 HC RX REV CODE- 250: Performed by: INTERNAL MEDICINE

## 2017-07-13 PROCEDURE — 97535 SELF CARE MNGMENT TRAINING: CPT

## 2017-07-13 PROCEDURE — 97165 OT EVAL LOW COMPLEX 30 MIN: CPT

## 2017-07-13 PROCEDURE — 74011000258 HC RX REV CODE- 258: Performed by: FAMILY MEDICINE

## 2017-07-13 PROCEDURE — 74011250636 HC RX REV CODE- 250/636: Performed by: FAMILY MEDICINE

## 2017-07-13 PROCEDURE — 80053 COMPREHEN METABOLIC PANEL: CPT

## 2017-07-13 PROCEDURE — 74011636637 HC RX REV CODE- 636/637: Performed by: FAMILY MEDICINE

## 2017-07-13 PROCEDURE — 65660000000 HC RM CCU STEPDOWN

## 2017-07-13 PROCEDURE — 74011250637 HC RX REV CODE- 250/637: Performed by: INTERNAL MEDICINE

## 2017-07-13 PROCEDURE — 85025 COMPLETE CBC W/AUTO DIFF WBC: CPT

## 2017-07-13 PROCEDURE — 74011250637 HC RX REV CODE- 250/637: Performed by: FAMILY MEDICINE

## 2017-07-13 PROCEDURE — 97116 GAIT TRAINING THERAPY: CPT

## 2017-07-13 PROCEDURE — 36415 COLL VENOUS BLD VENIPUNCTURE: CPT

## 2017-07-13 PROCEDURE — 82962 GLUCOSE BLOOD TEST: CPT

## 2017-07-13 PROCEDURE — 74011000258 HC RX REV CODE- 258: Performed by: INTERNAL MEDICINE

## 2017-07-13 PROCEDURE — 77030034849

## 2017-07-13 RX ORDER — POTASSIUM CHLORIDE 20 MEQ/1
40 TABLET, EXTENDED RELEASE ORAL
Status: COMPLETED | OUTPATIENT
Start: 2017-07-13 | End: 2017-07-13

## 2017-07-13 RX ORDER — AMIODARONE HYDROCHLORIDE 200 MG/1
400 TABLET ORAL 2 TIMES DAILY
Status: DISCONTINUED | OUTPATIENT
Start: 2017-07-14 | End: 2017-07-14 | Stop reason: HOSPADM

## 2017-07-13 RX ORDER — CLOPIDOGREL BISULFATE 75 MG/1
75 TABLET ORAL DAILY
Qty: 30 TAB | Refills: 11 | Status: SHIPPED | OUTPATIENT
Start: 2017-07-13

## 2017-07-13 RX ORDER — GUAIFENESIN 100 MG/5ML
81 LIQUID (ML) ORAL DAILY
Qty: 30 TAB | Refills: 0 | Status: SHIPPED | OUTPATIENT
Start: 2017-07-13 | End: 2017-08-12

## 2017-07-13 RX ORDER — FUROSEMIDE 40 MG/1
40 TABLET ORAL DAILY
Qty: 30 TAB | Refills: 3 | Status: SHIPPED | OUTPATIENT
Start: 2017-07-13

## 2017-07-13 RX ORDER — AMIODARONE HYDROCHLORIDE 200 MG/1
400 TABLET ORAL ONCE
Status: COMPLETED | OUTPATIENT
Start: 2017-07-13 | End: 2017-07-13

## 2017-07-13 RX ORDER — AMIODARONE HYDROCHLORIDE 200 MG/1
200 TABLET ORAL DAILY
Qty: 30 TAB | Refills: 3 | Status: SHIPPED | OUTPATIENT
Start: 2017-07-13

## 2017-07-13 RX ORDER — FUROSEMIDE 40 MG/1
40 TABLET ORAL ONCE
Status: COMPLETED | OUTPATIENT
Start: 2017-07-13 | End: 2017-07-13

## 2017-07-13 RX ORDER — PRAVASTATIN SODIUM 20 MG/1
20 TABLET ORAL DAILY
Qty: 30 TAB | Refills: 11 | Status: SHIPPED | OUTPATIENT
Start: 2017-07-13

## 2017-07-13 RX ORDER — POTASSIUM CHLORIDE 750 MG/1
20 TABLET, FILM COATED, EXTENDED RELEASE ORAL
Status: COMPLETED | OUTPATIENT
Start: 2017-07-13 | End: 2017-07-13

## 2017-07-13 RX ORDER — METOPROLOL SUCCINATE 25 MG/1
25 TABLET, EXTENDED RELEASE ORAL DAILY
Qty: 30 TAB | Refills: 11 | Status: SHIPPED | OUTPATIENT
Start: 2017-07-13

## 2017-07-13 RX ORDER — LISINOPRIL 5 MG/1
5 TABLET ORAL
Status: DISCONTINUED | OUTPATIENT
Start: 2017-07-13 | End: 2017-07-13

## 2017-07-13 RX ORDER — POTASSIUM CHLORIDE 750 MG/1
10 TABLET, FILM COATED, EXTENDED RELEASE ORAL DAILY
Qty: 30 TAB | Refills: 11 | Status: SHIPPED | OUTPATIENT
Start: 2017-07-13

## 2017-07-13 RX ORDER — FUROSEMIDE 40 MG/1
40 TABLET ORAL DAILY
Status: DISCONTINUED | OUTPATIENT
Start: 2017-07-14 | End: 2017-07-14 | Stop reason: HOSPADM

## 2017-07-13 RX ADMIN — LEVOTHYROXINE SODIUM 25 MCG: 25 TABLET ORAL at 09:06

## 2017-07-13 RX ADMIN — DOXYCYCLINE 100 MG: 100 INJECTION, POWDER, LYOPHILIZED, FOR SOLUTION INTRAVENOUS at 14:43

## 2017-07-13 RX ADMIN — DOXYCYCLINE 100 MG: 100 INJECTION, POWDER, LYOPHILIZED, FOR SOLUTION INTRAVENOUS at 23:24

## 2017-07-13 RX ADMIN — FUROSEMIDE 40 MG: 40 TABLET ORAL at 09:08

## 2017-07-13 RX ADMIN — CEFTRIAXONE 1 G: 1 INJECTION, POWDER, FOR SOLUTION INTRAMUSCULAR; INTRAVENOUS at 23:24

## 2017-07-13 RX ADMIN — PRAVASTATIN SODIUM 20 MG: 10 TABLET ORAL at 09:06

## 2017-07-13 RX ADMIN — POTASSIUM CHLORIDE 20 MEQ: 750 TABLET, FILM COATED, EXTENDED RELEASE ORAL at 14:42

## 2017-07-13 RX ADMIN — Medication 10 ML: at 05:20

## 2017-07-13 RX ADMIN — METOPROLOL SUCCINATE 25 MG: 25 TABLET, EXTENDED RELEASE ORAL at 09:06

## 2017-07-13 RX ADMIN — AMIODARONE HYDROCHLORIDE 400 MG: 200 TABLET ORAL at 19:49

## 2017-07-13 RX ADMIN — DEXTROSE MONOHYDRATE 5 MG/HR: 50 INJECTION, SOLUTION INTRAVENOUS at 23:25

## 2017-07-13 RX ADMIN — AMIODARONE HYDROCHLORIDE 200 MG: 200 TABLET ORAL at 18:21

## 2017-07-13 RX ADMIN — Medication 10 ML: at 14:48

## 2017-07-13 RX ADMIN — APIXABAN 2.5 MG: 2.5 TABLET, FILM COATED ORAL at 18:21

## 2017-07-13 RX ADMIN — AMIODARONE HYDROCHLORIDE 200 MG: 200 TABLET ORAL at 09:06

## 2017-07-13 RX ADMIN — CLOPIDOGREL BISULFATE 75 MG: 75 TABLET ORAL at 09:06

## 2017-07-13 RX ADMIN — ASPIRIN 81 MG CHEWABLE TABLET 81 MG: 81 TABLET CHEWABLE at 09:00

## 2017-07-13 RX ADMIN — POTASSIUM CHLORIDE 40 MEQ: 20 TABLET, EXTENDED RELEASE ORAL at 09:06

## 2017-07-13 RX ADMIN — POTASSIUM CHLORIDE 10 MEQ: 750 TABLET, FILM COATED, EXTENDED RELEASE ORAL at 09:00

## 2017-07-13 RX ADMIN — APIXABAN 2.5 MG: 2.5 TABLET, FILM COATED ORAL at 09:06

## 2017-07-13 RX ADMIN — INSULIN LISPRO 2 UNITS: 100 INJECTION, SOLUTION INTRAVENOUS; SUBCUTANEOUS at 18:21

## 2017-07-13 NOTE — PROGRESS NOTES
I met with pt to discuss d/c options. Pt will be going to stay with his ex wife and daughter in [place]. Ex wife's home address is:   [address], [address]   Daughter's home address is:  [address], [address]    PT/OT soloals complete with the recommendation of [facility] services. Pt stated he would be agreeable to [facility] services if there was an agency that would accept orders from a [place] MD. He stated he is only 2.5 hours from [address] [place] [address]. PT also recommended a RW. Pt stated he has made arrangements to borrow a RW from a [employer] member. He stated he will have access to the RW as long as he needs it. I will explore option of arranging [facility] in [place].      Klarissa Pleitez, [address] [place] [address] [phone]

## 2017-07-13 NOTE — CARDIO/PULMONARY
CP REHAB NOTE    Chart Review: Patient admitted for dizziness and fever. S/p PCI 7/11/2017  Medical History: HTN, paroxysmal Afib, BPH, diabetes  EF 35%  Former Smoker    Met with patient and wife for follow up post cath teaching. Printed material given at an earlier visit and discussed re: heart healthy habits, the cardiac diet, medication management, what to expect following coronary angioplasty, and post cardiac catheterization instructions. Discussed post catheterization restrictions, including:no manipulating the wrist for 24 hours, no lifting for 7 days, no soaking the wrist for 3 days . Also discussed what to do if bleeding or bruising at the cath insertion site is observed. Reviewed the cardiac diet (low NA/fat/CHOL), the importance of medication compliance, monitoring for any unusual signs & symptoms and when to call the doctor. No further questions at this time.

## 2017-07-13 NOTE — PROGRESS NOTES
Progress Note      7/13/2017 11:38 AM  NAME: Lissa Iyer   MRN:  [id number]   Admit Diagnosis: Pneumonia                          Assessment:                 1. Dyspnea, syncope, febrile, ?pneumonia, leukoctyosis, increased troponin consistent with NSTEMI s/p PCI of LAD, acute systolic chf  2. Cath 7/2017 with PCI of mLAD with GORDY  3. Acute systolic chf POA,  Echo 9/5681 EF 35%  4. Prox Afib/aflutter with RBBB/lAHB on flecainide, dig stopped in past due to bradycardia  5. DM  6. HTN  7. Dyslipidemia  8. CKD 1.3 Dr. Olya Sims  9. DANIELA in 2015 ok  10. Hypothyroid  11. Prostate CA  12. , very mild functional capacity  13. Cardiologist:  Dr. Davis Like                            Plan:                  Unusual presentation. Dyspnea, mild cough, not productive, CT suggest pneumonia, febrile, leukocytosis, on Abx. Breathing improving. No chest pain, no acute ECG changes, but increased troponin. No edema, does not appear to be in CHF. Sinus so far, hx of bradycardia     No PE or clear source of infection on CT. Echo raises possiblity of Takatsubo's  Cath with PCI of LAD.     1. Resume eliquis at lower dose 2.5mg bid tonight  2. Cont Added ASA for one month  3. Cont plavix 75mg daily for one year then back to ASA  4. Since now with CAD and CHF change flecainide to amiodarone 200mg po bid, discharge on 200mg daily  5. Cont Added low dose metoprolol 25mg  6. Resume captropril on discharge  7. Hold cardura  8. Change to po lasix, additional dose today  9. Add pravastatin 20    .       [x]        High complexity decision making was performed         Subjective:     Lissa Iyer denies chest pain, dyspnea improving. Discussed with RN events overnight.      Review of Systems:    Symptom Y/N Comments  Symptom Y/N Comments   Fever/Chills N   Chest Pain N    Poor Appetite N   Edema N    Cough N   Abdominal Pain N    Sputum N   Joint Pain N    SOB/STILL N   Pruritis/Rash N    Nausea/vomit N   Tolerating PT/OT Y Diarrhea N   Tolerating Diet Y    Constipation N   Other       Could NOT obtain due to:      Objective:      Physical Exam:    Last 24hrs VS reviewed since prior progress note. Most recent are:    Visit Vitals    /74    Pulse 74    Temp 97.3 °F (36.3 °C)    Resp 16    Ht 6' 2\" (1.88 m)    Wt 91.6 kg (201 lb 15.1 oz)    SpO2 (!) 87%    BMI 25.93 kg/m2       Intake/Output Summary (Last 24 hours) at 07/13/17 1234  Last data filed at 07/13/17 0509   Gross per 24 hour   Intake              510 ml   Output                0 ml   Net              510 ml        General Appearance: Well developed, well nourished, alert & oriented x 3,    no acute distress. Ears/Nose/Mouth/Throat: Hearing grossly normal.  Neck: Supple. Chest: Lungs clear to auscultation bilaterally. Cardiovascular: Regular rate and rhythm, S1S2 normal, no murmur. Abdomen: Soft, non-tender, bowel sounds are active. Extremities: No edema bilaterally. Skin: Warm and dry. PMH/SH reviewed - no change compared to H&P    Data Review    Telemetry: normal sinus rhythm     Lab Data Personally Reviewed:    Recent Labs      07/13/17 0518  07/12/17   0336   WBC  12.1*  14.6*   HGB  11.7*  12.1   HCT  34.2*  34.7*   PLT  110*  112*     No results for input(s): INR, PTP, APTT in the last 72 hours. No lab exists for component: Damion Sandhu   Recent Labs      07/13/17   0518  07/12/17   0336  07/11/17   0339   NA  142  139  139   K  3.3*  3.6  3.5   CL  107  107  108   CO2  26  23  23   BUN  21*  13  12   CREA  1.34*  1.00  1.06   GLU  117*  113*  101*   CA  8.6  8.4*  8.6     No results for input(s): CPK, CKNDX, TROIQ in the last 72 hours.     No lab exists for component: CPKMB  Lab Results   Component Value Date/Time    Cholesterol, total 118 07/09/2017 05:41 AM    HDL Cholesterol 31 07/09/2017 05:41 AM    LDL, calculated 70.2 07/09/2017 05:41 AM    Triglyceride 84 07/09/2017 05:41 AM    CHOL/HDL Ratio 3.8 07/09/2017 05:41 AM       Recent Labs 07/13/17   0518   SGOT  33   AP  118*   TP  6.0*   ALB  2.3*   GLOB  3.7     No results for input(s): PH, PCO2, PO2 in the last 72 hours.     Medications Personally Reviewed:    Current Facility-Administered Medications   Medication Dose Route Frequency    amiodarone (CORDARONE) tablet 200 mg  200 mg Oral BID    apixaban (ELIQUIS) tablet 2.5 mg  2.5 mg Oral BID    pravastatin (PRAVACHOL) tablet 20 mg  20 mg Oral DAILY    potassium chloride SR (KLOR-CON 10) tablet 10 mEq  10 mEq Oral DAILY    ADDaptor        sodium chloride (NS) flush 5-10 mL  5-10 mL IntraVENous Q8H    sodium chloride (NS) flush 5-10 mL  5-10 mL IntraVENous PRN    naloxone (NARCAN) injection 0.4 mg  0.4 mg IntraVENous PRN    clopidogrel (PLAVIX) tablet 75 mg  75 mg Oral DAILY    albuterol (PROVENTIL VENTOLIN) nebulizer solution 2.5 mg  2.5 mg Nebulization Q4H PRN    metoprolol succinate (TOPROL-XL) XL tablet 25 mg  25 mg Oral DAILY    loperamide (IMODIUM) capsule 2 mg  2 mg Oral Q4H PRN    ondansetron (ZOFRAN) injection 4 mg  4 mg IntraVENous Q6H PRN    doxycycline (VIBRAMYCIN) 100 mg in 0.9% sodium chloride (MBP/ADV) 100 mL  100 mg IntraVENous Q12H    sodium chloride (NS) flush 5-10 mL  5-10 mL IntraVENous PRN    levothyroxine (SYNTHROID) tablet 25 mcg  25 mcg Oral ACB    sodium chloride (NS) flush 5-10 mL  5-10 mL IntraVENous Q8H    sodium chloride (NS) flush 5-10 mL  5-10 mL IntraVENous PRN    cefTRIAXone (ROCEPHIN) 1 g in 0.9% sodium chloride (MBP/ADV) 50 mL  1 g IntraVENous Q24H    acetaminophen (TYLENOL) tablet 650 mg  650 mg Oral Q4H PRN    aspirin chewable tablet 81 mg  81 mg Oral DAILY    glucose chewable tablet 16 g  4 Tab Oral PRN    dextrose (D50W) injection syrg 12.5-25 g  12.5-25 g IntraVENous PRN    glucagon (GLUCAGEN) injection 1 mg  1 mg IntraMUSCular PRN    insulin lispro (HUMALOG) injection   SubCUTAneous AC&HS         Mayte Rodriguez MD

## 2017-07-13 NOTE — PROGRESS NOTES
Problem: Mobility Impaired (Adult and Pediatric)  Goal: *Acute Goals and Plan of Care (Insert Text)  Physical Therapy Goals  Initiated 7/8/2017  1. Patient will move from supine to sit and sit to supine in bed with independence within 7 day(s). 2. Patient will transfer from bed to chair and chair to bed with modified independence using the least restrictive device within 7 day(s). 3. Patient will perform sit to stand with modified independence within 7 day(s). 4. Patient will ambulate with modified independence for 250 feet with the least restrictive device within 7 day(s). 5. Patient will ascend/descend 4 stairs with 1 handrail(s) with independence within 7 day(s). PHYSICAL THERAPY TREATMENT  Patient: Emy Barney (7 y.o. male)  Date: 7/13/2017  Diagnosis: Pneumonia        Precautions:  falls  Chart, physical therapy assessment, plan of care and goals were reviewed. ASSESSMENT: pt progressing well towards goals, no LOB or SOB, good motivation, does well with transfers, vc's for safety and proper RW use. Progression toward goals:  [ ]      Improving appropriately and progressing toward goals  [X]      Improving slowly and progressing toward goals  [ ]      Not making progress toward goals and plan of care will be adjusted       PLAN:  Patient continues to benefit from skilled intervention to address the above impairments. Continue treatment per established plan of care.   Discharge Recommendations:  Home Health  Further Equipment Recommendations for Discharge:  rolling walker       OBJECTIVE DATA SUMMARY:      Critical Behavior:  Neurologic State: Alert  Orientation Level: Oriented X4  Cognition: Appropriate decision making, Appropriate for age attention/concentration, Appropriate safety awareness, Follows commands  Safety/Judgement: Awareness of environment, Good awareness of safety precautions, Fall prevention, Insight into deficits      Functional Mobility Training:  Bed Mobility:  Rolling: (received up and left up)     Transfers:  Sit to Stand: Supervision  Stand to Sit: Supervision  Bed to Chair: Contact guard assistance  Interventions: Verbal cues  Level of Assistance: Supervision      Balance:  Sitting: Intact; Without support  Standing: Intact; With support  Standing - Static: Good;Constant support  Standing - Dynamic : Good      Ambulation/Gait Training:  Distance (ft): 275 Feet (ft)  Assistive Device: Gait belt;Walker, rolling  Ambulation - Level of Assistance: Supervision  Gait Abnormalities: Decreased step clearance  Right Side Weight Bearing: Full  Left Side Weight Bearing: Full  Base of Support: Narrowed  Stance:  (equal)  Speed/Muriel: Pace decreased (<100 feet/min)  Step Length: Left shortened;Right shortened     Pain:  Pain Scale 1: Numeric (0 - 10)  Pain Intensity 1: 0     Activity Tolerance: fair     After treatment:   [X] Patient left in no apparent distress sitting up in chair  [ ] Patient left in no apparent distress in bed  [X] Call bell left within reach  [X] Nursing notified  [ ] Caregiver present  [ ] Bed alarm activated      COMMUNICATION/COLLABORATION:   The patients plan of care was discussed with: Registered Nurse     Matthew Crane PTA   Time Calculation: 20 mins

## 2017-07-13 NOTE — PROGRESS NOTES
1938: Patients HR found to be in the 120s-- palpable radial HR confirms. Pt denies SOB, pain, but slightly diaphoretic. Sinus tach with a BBB on the monitor. MD paged, awaiting return call. 1945: MD notified of patient's current status. Orders for amiodarone 400mg PO once given. 1949: Amiodarone administered, patient still remains asymptomatic.     2300: MD paged, patient's remains in sinus tach with a BBB, . Pt remains asymptomatic.     2315: MD returns page, orders for a cardizem gtt received. Orders to start drip at 5mg/hr, titrate to 10mg/hr if necessary. MD states to wean patient off drip by morning if possible. 2325: New IV started, Cardizem gtt initiated, patient placed on frequent vital signs. HR remains at 120, no acute s/s of distress, bp stable at 108/76. Will continue to monitor closely.

## 2017-07-13 NOTE — PROGRESS NOTES
Bedside and Verbal shift change report given to Amalia Lobato RN (oncoming nurse) by Phyllis Christensen RN (offgoing nurse). Report included the following information SBAR, Kardex, MAR, Recent Results and Cardiac Rhythm NSR.

## 2017-07-13 NOTE — PROGRESS NOTES
Hospitalist Progress Note    NAME: Ernesto Began   :  1935   MRN:  [id number]     Admit date: 2017    Today's date: 17    PCP: Laina Dao. Jannet Archer DO          Assessment / Plan:    Severe sepsis POA WBC 22,200, HR 93, temp 102.7, lactate 2.3 .   Right lower lobe pneumonia POA  Diarrhea POA; improved  Presented with nonspecific weakness, increasing SOB, mild cough  CT chest with subtle ASD at the [facility] remain negative  UA negative for UA  CT scan abdomen/pelvis negative for colitis or intraabdominal source  stool studies unremarkable  IV ceftriaxone and doxycycline(stop azithromycin on the flecainide) day 7      Change to cefuroxime/doxycycline to complete 7-10 days total at discharge  -repeat CXR on  with mild ASD in right base  -Leukocytosis down-trending    Non ST elevation MI POA peak troponin 5.59  Acute on chronic systolic CHF LVEF 35 (possible POA)  CAD with 80% mid LAD lesion s/p BMS PCI on afternoon of   Essential HTN POA  Chronic atrial fibrillation POA  Eliquis, Plavix, ASA  Lopressor; d/c NTG paste and start low dose lisinopril tonight; check BMP again tomorrow  Continue Amiodarone  No PE on CTA  Change lasix to 40 mg PO daily dosing    Lightheadedness/fall POA  [place] CT scan negative   PT/OT consults    Diabetes type 2 POA HgBa1c 5.6  SSI    Hypokalemia: from diuresis  -replete potassium and monitor    Code status: Full  Prophylaxis: Lovenox  Recommended Disposition: Home w/Family     Subjective:     Chief Complaint / Reason for Physician Visit f/u severe sepsis  Denies SOB  Reports sleeping well and feeling better today  Working well with therapy ([facility] to be recommended)  Patient going to [place] after discharge (just over [place] [facility]) to be closer with family      Review of Systems:  Symptom Y/N Comments  Symptom Y/N Comments   Fever/Chills    Chest Pain n    Poor Appetite    Edema     Cough y   Abdominal Pain n    Sputum    Joint Pain     SOB/STILL n Pruritis/Rash     Nausea/vomit    Tolerating PT/OT     Diarrhea    Tolerating Diet     Constipation    Other       Could NOT obtain due to:      Objective:     VITALS:   Last 24hrs VS reviewed since prior progress note. Most recent are:  Patient Vitals for the past 24 hrs:   Temp Pulse Resp BP SpO2   07/13/17 0715 98.8 °F (37.1 °C) 76 - 119/64 -   07/13/17 0509 98.2 °F (36.8 °C) 77 16 121/63 94 %   07/12/17 2237 98.6 °F (37 °C) 90 18 111/67 96 %   07/12/17 1911 - - - - 95 %   07/12/17 1810 98.4 °F (36.9 °C) 90 17 104/65 -   07/12/17 1403 - - - - 94 %   07/12/17 1203 98.6 °F (37 °C) 97 18 98/59 93 %   07/12/17 1202 - 99 - 103/64 -   07/12/17 1201 - (!) 108 - - 97 %   07/12/17 1100 - 97 - 100/63 97 %       Intake/Output Summary (Last 24 hours) at 07/13/17 1033  Last data filed at 07/13/17 0509   Gross per 24 hour   Intake              510 ml   Output                0 ml   Net              510 ml        Wt Readings from Last 12 Encounters:   07/13/17 91.6 kg (201 lb 15.1 oz)   07/06/17 92.1 kg (203 lb)   10/12/16 86.8 kg (191 lb 5.8 oz)   05/11/15 90.3 kg (199 lb)   09/18/14 91.6 kg (202 lb)   04/28/14 91.6 kg (202 lb)   11/25/13 90.7 kg (200 lb)   10/29/13 91.6 kg (202 lb)   10/21/13 92.1 kg (203 lb)   04/15/13 91.6 kg (202 lb)   10/15/12 88.9 kg (196 lb)   04/11/12 87.5 kg (193 lb)       PHYSICAL EXAM:  General: WD, WN. Alert, cooperative, no acute distress    EENT:  PERRL. Anicteric sclerae. MMM  Resp:  Crackles improved on bibasilar ascultation, no wheezing. No accessory muscle use  CV:  Regular  rhythm,  No edema  GI:  Soft, non-distended, Non tender.  +Bowel sounds, no rebound  Neurologic:  Alert and oriented X 3, normal speech, non focal motor exam  Psych:   Good insight. Not anxious nor agitated  MS:  No joint swelling or erythema  Skin:  No rashes.   No jaundice    Reviewed most current lab test results and cultures  YES  Reviewed most current radiology test results   YES  Review and summation of old records today    NO  Reviewed patient's current orders and MAR    YES  PMH/SH reviewed - no change compared to H&P  ________________________________________________________________________  Care Plan discussed with:    Comments   Patient x    Family  x Ex-wife   RN     Care Manager     Consultant  x PT                     Multidiciplinary team rounds were held today with , nursing, pharmacist and clinical coordinator. Patient's plan of care was discussed; medications were reviewed and discharge planning was addressed. ________________________________________________________________________  Total NON critical care TIME: 25   Minutes    Total CRITICAL CARE TIME Spent:   Minutes non procedure based      Comments   >50% of visit spent in counseling and coordination of care     ________________________________________________________________________  Josefa Benton MD     Procedures: see electronic medical records for all procedures/Xrays and details which were not copied into this note but were reviewed prior to creation of Plan. LABS:  I reviewed today's most current labs and imaging studies.   Pertinent labs include:  Recent Labs      07/13/17 0518  07/12/17 0336  07/11/17   0339   WBC  12.1*  14.6*  12.7*   HGB  11.7*  12.1  11.8*   HCT  34.2*  34.7*  34.6*   PLT  110*  112*  115*     Recent Labs      07/13/17   0518  07/12/17   0336  07/11/17   0339   NA  142  139  139   K  3.3*  3.6  3.5   CL  107  107  108   CO2  26  23  23   GLU  117*  113*  101*   BUN  21*  13  12   CREA  1.34*  1.00  1.06   CA  8.6  8.4*  8.6   ALB  2.3*   --    --    TBILI  0.5   --    --    SGOT  33   --    --    ALT  77   --    --

## 2017-07-14 VITALS
HEART RATE: 70 BPM | TEMPERATURE: 98.1 F | HEIGHT: 74 IN | DIASTOLIC BLOOD PRESSURE: 59 MMHG | BODY MASS INDEX: 25.92 KG/M2 | WEIGHT: 201.94 LBS | SYSTOLIC BLOOD PRESSURE: 106 MMHG | OXYGEN SATURATION: 97 % | RESPIRATION RATE: 16 BRPM

## 2017-07-14 PROBLEM — I50.21 SYSTOLIC CHF, ACUTE (HCC): Status: ACTIVE | Noted: 2017-07-14

## 2017-07-14 PROBLEM — I21.4 NSTEMI (NON-ST ELEVATED MYOCARDIAL INFARCTION) (HCC): Status: ACTIVE | Noted: 2017-07-14

## 2017-07-14 PROBLEM — Z95.5 PRESENCE OF STENT IN LAD CORONARY ARTERY: Status: ACTIVE | Noted: 2017-07-14

## 2017-07-14 LAB
ANION GAP BLD CALC-SCNC: 9 MMOL/L (ref 5–15)
BACTERIA SPEC CULT: ABNORMAL
BASOPHILS # BLD AUTO: 0 K/UL (ref 0–0.1)
BASOPHILS # BLD: 0 % (ref 0–1)
BUN SERPL-MCNC: 23 MG/DL (ref 6–20)
BUN/CREAT SERPL: 18 (ref 12–20)
C JEJUNI+C COLI AG STL QL: NEGATIVE
CALCIUM SERPL-MCNC: 8.9 MG/DL (ref 8.5–10.1)
CHLORIDE SERPL-SCNC: 108 MMOL/L (ref 97–108)
CO2 SERPL-SCNC: 23 MMOL/L (ref 21–32)
CREAT SERPL-MCNC: 1.28 MG/DL (ref 0.7–1.3)
DIFFERENTIAL METHOD BLD: ABNORMAL
E COLI SXT1+2 STL IA: NO GROWTH
EOSINOPHIL # BLD: 1.2 K/UL (ref 0–0.4)
EOSINOPHIL NFR BLD: 13 % (ref 0–7)
ERYTHROCYTE [DISTWIDTH] IN BLOOD BY AUTOMATED COUNT: 14.1 % (ref 11.5–14.5)
GLUCOSE BLD STRIP.AUTO-MCNC: 122 MG/DL (ref 65–100)
GLUCOSE SERPL-MCNC: 104 MG/DL (ref 65–100)
HCT VFR BLD AUTO: 34.4 % (ref 36.6–50.3)
HGB BLD-MCNC: 11.7 G/DL (ref 12.1–17)
LYMPHOCYTES # BLD AUTO: 31 % (ref 12–49)
LYMPHOCYTES # BLD: 3 K/UL (ref 0.8–3.5)
MCH RBC QN AUTO: 33 PG (ref 26–34)
MCHC RBC AUTO-ENTMCNC: 34 G/DL (ref 30–36.5)
MCV RBC AUTO: 96.9 FL (ref 80–99)
MONOCYTES # BLD: 0.7 K/UL (ref 0–1)
MONOCYTES NFR BLD AUTO: 7 % (ref 5–13)
NEUTS SEG # BLD: 4.7 K/UL (ref 1.8–8)
NEUTS SEG NFR BLD AUTO: 49 % (ref 32–75)
PLATELET # BLD AUTO: 126 K/UL (ref 150–400)
POTASSIUM SERPL-SCNC: 4 MMOL/L (ref 3.5–5.1)
RBC # BLD AUTO: 3.55 M/UL (ref 4.1–5.7)
RBC MORPH BLD: ABNORMAL
SERVICE CMNT-IMP: ABNORMAL
SERVICE CMNT-IMP: ABNORMAL
SODIUM SERPL-SCNC: 140 MMOL/L (ref 136–145)
WBC # BLD AUTO: 9.6 K/UL (ref 4.1–11.1)

## 2017-07-14 PROCEDURE — 82962 GLUCOSE BLOOD TEST: CPT

## 2017-07-14 PROCEDURE — 80048 BASIC METABOLIC PNL TOTAL CA: CPT

## 2017-07-14 PROCEDURE — 74011250637 HC RX REV CODE- 250/637: Performed by: FAMILY MEDICINE

## 2017-07-14 PROCEDURE — 74011000250 HC RX REV CODE- 250: Performed by: INTERNAL MEDICINE

## 2017-07-14 PROCEDURE — 85025 COMPLETE CBC W/AUTO DIFF WBC: CPT

## 2017-07-14 PROCEDURE — 74011250637 HC RX REV CODE- 250/637: Performed by: INTERNAL MEDICINE

## 2017-07-14 PROCEDURE — 36415 COLL VENOUS BLD VENIPUNCTURE: CPT

## 2017-07-14 RX ORDER — LOPERAMIDE HYDROCHLORIDE 2 MG/1
2 CAPSULE ORAL
Qty: 10 CAP | Refills: 0 | Status: SHIPPED
Start: 2017-07-14 | End: 2017-07-24

## 2017-07-14 RX ADMIN — Medication 10 ML: at 04:55

## 2017-07-14 RX ADMIN — PRAVASTATIN SODIUM 20 MG: 10 TABLET ORAL at 08:34

## 2017-07-14 RX ADMIN — AMIODARONE HYDROCHLORIDE 400 MG: 200 TABLET ORAL at 08:34

## 2017-07-14 RX ADMIN — CLOPIDOGREL BISULFATE 75 MG: 75 TABLET ORAL at 08:34

## 2017-07-14 RX ADMIN — ASPIRIN 81 MG CHEWABLE TABLET 81 MG: 81 TABLET CHEWABLE at 08:34

## 2017-07-14 RX ADMIN — FUROSEMIDE 40 MG: 40 TABLET ORAL at 08:34

## 2017-07-14 RX ADMIN — LEVOTHYROXINE SODIUM 25 MCG: 25 TABLET ORAL at 08:34

## 2017-07-14 RX ADMIN — POTASSIUM CHLORIDE 10 MEQ: 750 TABLET, FILM COATED, EXTENDED RELEASE ORAL at 08:34

## 2017-07-14 RX ADMIN — METOPROLOL SUCCINATE 25 MG: 25 TABLET, EXTENDED RELEASE ORAL at 08:34

## 2017-07-14 RX ADMIN — APIXABAN 2.5 MG: 2.5 TABLET, FILM COATED ORAL at 08:34

## 2017-07-14 NOTE — PROGRESS NOTES
Pt received discharge instructions and prescriptions. Pt states understanding of follow-up care and side effects of medications. IV removed. Pt has all belongings.  Demetrice Guzman RN

## 2017-07-14 NOTE — DISCHARGE SUMMARY
Hospitalist Discharge Summary     Patient ID:  Jasmin Alvarado  [id number]  [de-identified] y.o.  1935    PCP on record: Debra Wolff Rich Aranda date: 7/7/2017  Discharge date and time: 7/14/2017      DISCHARGE DIAGNOSIS:   Paroxysmal atrial fibrillation ([facility] (4/15/2013)      Pneumonia (7/7/2017)      NSTEMI (non-ST elevated myocardial infarction) ([facility] (7/14/2017)      Presence of stent in LAD coronary artery (1/59/3699)      Systolic CHF, acute ([facility] (7/14/2017)      CONSULTATIONS:  IP CONSULT TO CARDIOLOGY    Excerpted HPI from H&P of Devon Briscoe MD:  Francisco Javier Feldman OF PRESENT ILLNESS: The patient is an 26-year-old   gentleman with past medical history of diabetes, diabetic nephropathy,   hypertension, hypothyroidism, paroxysmal atrial fibrillation and BPH,   who presents to the hospital with the above-mentioned symptoms. History was obtained from the patient as well as the patient's wife, who   was present at the bedside. The patient reports that he had presented   to the ER yesterday secondary to a ground-level fall due to   lightheadedness and \"foot feeling off balance. \"  The patient reports that   workup was done yesterday and he was told that he may have a UTI   and was discharged home on Keflex. The patient reports that he went   home, continued to be lightheaded, felt significantly \"off balance,\"   was walking by holding the walls. Also has cough and the wife reports   that he was extremely short of breath. The wife also reports that the   patient had had some swelling in his legs yesterday which has   resolved today. The patient reports that he has neuropathy and had had   extensive workup for bilateral lower extremity neuropathy. The patient   reports that his cough is nonproductive in nature. He denies any   headache or blurry vision associated with the symptoms.  The patient   also denies any photophobia, trouble swallowing, trouble with speech,   any chest pain, abdominal pain, constipation, diarrhea, urinary   symptoms, focal or generalized neurological weakness besides the   symptoms mentioned above, recent travel, sick contacts or any other   concerns or problems. The patient reports currently he does not have   any pain and did not hurt himself when he had the fall yesterday. The   patient also reports that he is incontinent of bladder and bowel and has   been going on for many years. The patient was found to have a fever of   101.1 degrees Fahrenheit in the ER and was requested to be admitted   to the hospital for management and evaluation. The patient denies any   other complaints or problems. \"     ______________________________________________________________________  DISCHARGE SUMMARY/HOSPITAL COURSE:  for full details see H&P, daily progress notes, labs, consult notes. Hospitalization per problem below:  Severe sepsis; resolved POA WBC 22,200, HR 93, temp 102.7, lactate 2.3 .   Right lower lobe pneumonia POA, completed antibiotic therapy in hospital    Presented with nonspecific weakness, increasing SOB, mild cough  CT chest with subtle ASD at the [facility] remain negative  UA negative for UA  IV ceftriaxone and doxycycline (no azithromycin since on the flecainide PTA) -->completed 7 days in hospita  -repeat CXR on 7/12 with mild ASD in right base  -Leukocytosis normalized     Non ST elevation MI POA peak troponin 6.96  Acute systolic CHF LVEF 35 (possible POA) - difficult to establish if there was a chronic component to patient's CHF  CAD with 80% mid LAD lesion s/p BMS PCI on afternoon of 7/11  Essential HTN POA  Chronic atrial fibrillation POA  Eliquis, Plavix, ASA; patient is to stop ASA after 30 days per Cardiology  Lopressor, Lasix and Amiodarone  No PE on CTA  No ACEI/ARB as patient's BP is in the low 100's on Toprol alone     Lightheadedness/fall POA; improved  Head CT scan negative   PT/OT consults recommending [facility]; arranged     Diabetes type 2 ruled out HgBa1c 5.6     Hypokalemia: from diuresis  -daily potassium Rx'd on discharge     Diarrhea POA; improved  CT scan abdomen/pelvis negative for colitis or intraabdominal source  stool studies unremarkable (Pseudomonas on Stool Cx scant and does not warrant treatment)  -Imodium prn and probiotics recommended, suspect some element of antibiotic associated diarrhea    _______________________________________________________________________  Patient seen and examined by me on discharge day. Pertinent Findings:  Gen:    Not in distress  Chest: Clear lungs  CVS:   Regular rhythm. No edema  Abd:  Soft, not distended, not tender  Neuro:  Alert  _______________________________________________________________________  DISCHARGE MEDICATIONS:   Current Discharge Medication List      START taking these medications    Details   loperamide (IMODIUM) 2 mg capsule Take 1 Cap by mouth every four (4) hours as needed for Diarrhea for up to 10 days. This is available over the counter  Qty: 10 Cap, Refills: 0      amiodarone (CORDARONE) 200 mg tablet Take 1 Tab by mouth daily. Qty: 30 Tab, Refills: 3      aspirin 81 mg chewable tablet Take 1 Tab by mouth daily for 30 days. Qty: 30 Tab, Refills: 0      clopidogrel (PLAVIX) 75 mg tab Take 1 Tab by mouth daily. Qty: 30 Tab, Refills: 11      metoprolol succinate (TOPROL-XL) 25 mg XL tablet Take 1 Tab by mouth daily. Qty: 30 Tab, Refills: 11      potassium chloride SR (KLOR-CON 10) 10 mEq tablet Take 1 Tab by mouth daily. Qty: 30 Tab, Refills: 11      pravastatin (PRAVACHOL) 20 mg tablet Take 1 Tab by mouth daily. Qty: 30 Tab, Refills: 11      furosemide (LASIX) 40 mg tablet Take 1 Tab by mouth daily. Qty: 30 Tab, Refills: 3         CONTINUE these medications which have CHANGED    Details   apixaban (ELIQUIS) 2.5 mg tablet Take 1 Tab by mouth two (2) times a day.   Qty: 60 Tab, Refills: 11         CONTINUE these medications which have NOT CHANGED    Details   levothyroxine (SYNTHROID) 25 mcg tablet TAKE 1 TABLET DAILY  Qty: 90 Tab, Refills: 3         STOP taking these medications       captopril (CAPOTEN) 50 mg tablet Comments:   Reason for Stopping:         cephALEXin (KEFLEX) 500 mg capsule Comments:   Reason for Stopping:         doxazosin (CARDURA) 4 mg tablet Comments:   Reason for Stopping:         flecainide (TAMBOCOR) 100 mg tablet Comments:   Reason for Stopping:         captopril (CAPOTEN) 50 mg tablet Comments:   Reason for Stopping:               My Recommended Diet, Activity, Wound Care, and follow-up labs are listed in the patient's Discharge Insturctions which I have personally completed and reviewed. _______________________________________________________________________  DISPOSITION:    Home with Family: x   Home with HH/PT/OT/RN:    SNF/LTC:    DIAMOND:    OTHER:        Condition at Discharge:  Stable  _______________________________________________________________________  Follow up with:   PCP : Carolyn Burgess. Jennifer Rdz, DO  Follow-up Information     Follow up With Details Comments [id number] [facility] TATIANA Coley Go on 7/31/2017 Cardiology follow up at [address] [place] [address] your potassium and kidney function checked in 1 week and tell them that on 7/14 your BUN was 23 and Cr was 1.28 (so they will have comparison).     Ryan Aguirre, [address] [facility]  [address] [address] [phone]                Total time in minutes spent coordinating this discharge (includes going over instructions, follow-up, prescriptions, and preparing report for sign off to her PCP) :  35 minutes    Signed:  Vernona Curling, MD

## 2017-07-14 NOTE — PROGRESS NOTES
Progress Note      7/14/2017 11:38 AM  NAME: Viktoria Cheung   MRN:  [id number]   Admit Diagnosis: Pneumonia                          Assessment:                 1. Dyspnea, syncope, febrile, ?pneumonia, leukoctyosis, increased troponin consistent with NSTEMI s/p PCI of LAD, acute systolic chf  2. Cath 7/2017 with PCI of mLAD with GORDY  3. Acute systolic chf POA,  Echo 4/8113 EF 35%  4. Prox Afib/aflutter with RBBB/lAHB on flecainide, dig stopped in past due to bradycardia  5. DM  6. HTN  7. Dyslipidemia  8. CKD 1.3 Dr. Bethanie Ann  9. DANIELA in 2015 ok  10. Hypothyroid  11. Prostate CA  12. , very mild functional capacity  13. Cardiologist:  Dr. Sandra Dumont                            Plan:                  Unusual presentation. Dyspnea, mild cough, not productive, CT suggest pneumonia, febrile, leukocytosis, on Abx. Breathing improving. No chest pain, no acute ECG changes, but increased troponin. No edema, does not appear to be in CHF. Sinus so far, hx of bradycardia, paf last night, back in sinus this AM.     No PE or clear source of infection on CT. Echo raises possiblity of Takatsubo's  Cath with PCI of LAD.     1. Resume eliquis at lower dose 2.5mg bid tonight  2. Cont Added ASA for one month  3. Cont plavix 75mg daily for one year then back to ASA  4. Since now with CAD and CHF change flecainide to amiodarone discharge on 200mg daily  5. Cont Added low dose metoprolol 25mg  6. Hold captropril for now due to low bp  7. Hold cardura  8. Cont lasix  9. Cont Added pravastatin 20    Stable from cardiac perspective  Follow up with me 2-4 weeks. .       [x]        High complexity decision making was performed         Subjective:     Viktoria Cheung denies chest pain, dyspnea improving. Discussed with RN events overnight.      Review of Systems:    Symptom Y/N Comments  Symptom Y/N Comments   Fever/Chills N   Chest Pain N    Poor Appetite N   Edema N    Cough N   Abdominal Pain N    Sputum N   Joint Pain N    SOB/STILL N   Pruritis/Rash N    Nausea/vomit N   Tolerating PT/OT Y    Diarrhea N   Tolerating Diet Y    Constipation N   Other       Could NOT obtain due to:      Objective:      Physical Exam:    Last 24hrs VS reviewed since prior progress note. Most recent are:    Visit Vitals    /59 (BP 1 Location: Right arm)    Pulse 70    Temp 98.1 °F (36.7 °C)    Resp 16    Ht 6' 2\" (1.88 m)    Wt 91.6 kg (201 lb 15.1 oz)    SpO2 97%    BMI 25.93 kg/m2       Intake/Output Summary (Last 24 hours) at 07/14/17 0803  Last data filed at 07/14/17 0700   Gross per 24 hour   Intake              480 ml   Output             1000 ml   Net             -520 ml        General Appearance: Well developed, well nourished, alert & oriented x 3,    no acute distress. Ears/Nose/Mouth/Throat: Hearing grossly normal.  Neck: Supple. Chest: Lungs clear to auscultation bilaterally. Cardiovascular: Regular rate and rhythm, S1S2 normal, no murmur. Abdomen: Soft, non-tender, bowel sounds are active. Extremities: No edema bilaterally. Skin: Warm and dry. PMH/SH reviewed - no change compared to H&P    Data Review    Telemetry: normal sinus rhythm     Lab Data Personally Reviewed:    Recent Labs      07/14/17   0501  07/13/17   0518   WBC  9.6  12.1*   HGB  11.7*  11.7*   HCT  34.4*  34.2*   PLT  126*  110*     No results for input(s): INR, PTP, APTT in the last 72 hours. No lab exists for component: Priyanka Jaylen   Recent Labs      07/14/17   0501  07/13/17   0518  07/12/17   0336   NA  140  142  139   K  4.0  3.3*  3.6   CL  108  107  107   CO2  23  26  23   BUN  23*  21*  13   CREA  1.28  1.34*  1.00   GLU  104*  117*  113*   CA  8.9  8.6  8.4*     No results for input(s): CPK, CKNDX, TROIQ in the last 72 hours.     No lab exists for component: CPKMB  Lab Results   Component Value Date/Time    Cholesterol, total 118 07/09/2017 05:41 AM    HDL Cholesterol 31 07/09/2017 05:41 AM    LDL, calculated 70.2 07/09/2017 05:41 AM    Triglyceride 84 07/09/2017 05:41 AM    CHOL/HDL Ratio 3.8 07/09/2017 05:41 AM       Recent Labs      07/13/17   0518   SGOT  33   AP  118*   TP  6.0*   ALB  2.3*   GLOB  3.7     No results for input(s): PH, PCO2, PO2 in the last 72 hours.     Medications Personally Reviewed:    Current Facility-Administered Medications   Medication Dose Route Frequency    furosemide (LASIX) tablet 40 mg  40 mg Oral DAILY    amiodarone (CORDARONE) tablet 400 mg  400 mg Oral BID    apixaban (ELIQUIS) tablet 2.5 mg  2.5 mg Oral BID    pravastatin (PRAVACHOL) tablet 20 mg  20 mg Oral DAILY    potassium chloride SR (KLOR-CON 10) tablet 10 mEq  10 mEq Oral DAILY    ADDaptor        sodium chloride (NS) flush 5-10 mL  5-10 mL IntraVENous Q8H    sodium chloride (NS) flush 5-10 mL  5-10 mL IntraVENous PRN    naloxone (NARCAN) injection 0.4 mg  0.4 mg IntraVENous PRN    clopidogrel (PLAVIX) tablet 75 mg  75 mg Oral DAILY    albuterol (PROVENTIL VENTOLIN) nebulizer solution 2.5 mg  2.5 mg Nebulization Q4H PRN    metoprolol succinate (TOPROL-XL) XL tablet 25 mg  25 mg Oral DAILY    loperamide (IMODIUM) capsule 2 mg  2 mg Oral Q4H PRN    ondansetron (ZOFRAN) injection 4 mg  4 mg IntraVENous Q6H PRN    doxycycline (VIBRAMYCIN) 100 mg in 0.9% sodium chloride (MBP/ADV) 100 mL  100 mg IntraVENous Q12H    levothyroxine (SYNTHROID) tablet 25 mcg  25 mcg Oral ACB    cefTRIAXone (ROCEPHIN) 1 g in 0.9% sodium chloride (MBP/ADV) 50 mL  1 g IntraVENous Q24H    acetaminophen (TYLENOL) tablet 650 mg  650 mg Oral Q4H PRN    aspirin chewable tablet 81 mg  81 mg Oral DAILY    glucose chewable tablet 16 g  4 Tab Oral PRN    dextrose (D50W) injection syrg 12.5-25 g  12.5-25 g IntraVENous PRN    glucagon (GLUCAGEN) injection 1 mg  1 mg IntraMUSCular PRN    insulin lispro (HUMALOG) injection   SubCUTAneous AC&HS         Ashtyn Laura MD

## 2017-07-14 NOTE — CARDIO/PULMONARY
CP REHAB NOTE     Chart Review: Patient admitted for dizziness and fever. S/p PCI 7/11/2017. Acute systolic CHF with EF 73%. Medical History: HTN, paroxysmal Afib, BPH, diabetes  Former smoker. Met with patient and his wife. Cardiac teaching folder at bedside. Briefly reviewed post cath instructions including no lifting more than 3-5 pounds for seven days, no soaking the wrist and no manipulation of the wrist for 24 hours. Also discussed what to do if bleeding or bruising at the cath insertion site is observed. Discussed monitoring for unusual signs and symptoms and when to call the doctor. Reviewed the importance of medication compliance. Gave/reviewed CHF teaching packet including s/s worsening CHF, daily wts, when to call MD for fluid wt gain, low sodium diet, balancing activity/rest, the importance of taking all meds as prescribed and MD follow up. Pt has scales at home to use and understands rationale for daily weight. He does have a pill box and knows the name of his diuretic. Low sodium diet was reviewed including specific high sodium foods to avoid, reading food labels for sodium content and use of salt substitute such as Mrs. Escudero. Questions answered. Pt and wife indicated understanding but would benefit from further reinforcement.

## 2017-07-14 NOTE — DISCHARGE INSTRUCTIONS
HOSPITALIST DISCHARGE INSTRUCTIONS    NAME: Alona Nguyen   :  1935   MRN:  [id number]     Date/Time:  2017 9:56 AM    ADMIT DATE: 2017     DISCHARGE DATE: 2017     DISCHARGE DIAGNOSIS:  Active Problems:    Paroxysmal atrial fibrillation ([facility] (4/15/2013)      Pneumonia (2017)      NSTEMI (non-ST elevated myocardial infarction) ([facility] (2017)      Presence of stent in LAD coronary artery ()      Systolic CHF, acute ([facility] (2017)         MEDICATIONS:  As per medication reconciliation  list  · It is important that you take the medication exactly as they are prescribed. · Keep your medication in the bottles provided by the pharmacist and keep a list of the medication names, dosages, and times to be taken in your wallet. · Do not take other medications without consulting your doctor. · Also take an over the counter Probiotic to see if this helps with you diarrhea. Pain Management: Tylenol (acetaminophen) as needed (avoid NSAIDs like Ibuprofen and Aleve, etc.. Annamary Ripa Annamary Ripa Annamary Ripa )    What to do at Home    Recommended diet:  Cardiac Diet    Recommended activity: Light Activity as tolerated        If you experience any of the following symptoms then please call your primary care physician or return to the emergency room if you cannot get hold of your doctor:  Fever, chills, nausea, vomiting, diarrhea, change in mentation, falling, bleeding, shortness of breath, chest pain or any other concerning symptoms. Follow Up: Follow-up with Cardiology as arranged. Get you potassium and kidney checked in 1 week. Information obtained by :  I understand that if any problems occur once I am at home I am to contact my physician. I understand and acknowledge receipt of the instructions indicated above.                                                                                                                                            Physician's or R.N.'s Signature Date/Time                                                                                                                                              Patient or Representative Signature                                                          Date/Time

## 2017-07-17 LAB — ACT BLD: 411 SECS (ref 79–138)

## 2017-07-31 NOTE — PROGRESS NOTES
Home Health Care Discharge Planning: [facility]  Face to Face Encounter      NAME: Rosa Rolon   :  1935   MRN:  [id number]     Primary Diagnosis: Systolic CHF, CAD with recent Stent    Date of Face to Face:  2017 10:45 AM                                  Face to Face Encounter findings are related to primary reason for home care:   YES    1. I certify that the patient needs intermittent skilled nursing care, physical therapy and/or speech therapy. I will not be following this patient in the Community and Dr. Carolyn Burgess. Mayra Maldonado will be responsible for signing the [address] 2. Initial Orders for Care: Skilled Nursing, Physical Therapy and Occupational Therapy    3. I certify that this patient is homebound because of illness or injury, need the aid of supportive devices such as crutches, canes, wheelchairs, and walkers; the use of special transportation; or the assistance of another person in order to leave their place of residence. There exists a normal inability to leave home and leaving home requires a considerable and taxing effort. 4. I certify that this patient is under my care and that I had a Face-to-Face Encounter that meets the physician Face-to-Face Encounter requirements. Document the physical findings from the Face-to-Face Encounter that support the need for skilled services: Has new diagnosis that requires skilled nursing teaching and intervention , Needs skilled safety assessment and interventions  and Has new finding of weakness and altered mobility that requires skilled physical/occupational therapy services for evaluation and interventions.      Vernona Curling, MD  Discharging Physician  Office: [phone]  Fax:   [phone] DISPLAY PLAN FREE TEXT
